# Patient Record
Sex: MALE | Race: WHITE | Employment: OTHER | URBAN - METROPOLITAN AREA
[De-identification: names, ages, dates, MRNs, and addresses within clinical notes are randomized per-mention and may not be internally consistent; named-entity substitution may affect disease eponyms.]

---

## 2019-02-11 ENCOUNTER — OFFICE VISIT (OUTPATIENT)
Dept: URGENT CARE | Facility: CLINIC | Age: 59
End: 2019-02-11
Payer: COMMERCIAL

## 2019-02-11 VITALS
RESPIRATION RATE: 16 BRPM | HEIGHT: 67 IN | DIASTOLIC BLOOD PRESSURE: 96 MMHG | SYSTOLIC BLOOD PRESSURE: 136 MMHG | WEIGHT: 156 LBS | HEART RATE: 80 BPM | BODY MASS INDEX: 24.48 KG/M2 | TEMPERATURE: 98.8 F | OXYGEN SATURATION: 96 %

## 2019-02-11 DIAGNOSIS — I10 ESSENTIAL HYPERTENSION: ICD-10-CM

## 2019-02-11 DIAGNOSIS — Z76.0 ENCOUNTER FOR MEDICATION REFILL: Primary | ICD-10-CM

## 2019-02-11 PROCEDURE — 99213 OFFICE O/P EST LOW 20 MIN: CPT | Performed by: NURSE PRACTITIONER

## 2019-02-11 RX ORDER — VALSARTAN AND HYDROCHLOROTHIAZIDE 320; 12.5 MG/1; MG/1
1 TABLET, FILM COATED ORAL DAILY
Qty: 30 TAB | Refills: 0 | Status: SHIPPED | OUTPATIENT
Start: 2019-02-11 | End: 2020-08-24

## 2019-02-11 RX ORDER — VALSARTAN AND HYDROCHLOROTHIAZIDE 320; 12.5 MG/1; MG/1
1 TABLET, FILM COATED ORAL DAILY
Qty: 30 TAB | Refills: 3 | Status: SHIPPED | OUTPATIENT
Start: 2019-02-11 | End: 2019-02-11 | Stop reason: SDUPTHER

## 2019-02-11 RX ORDER — VALSARTAN AND HYDROCHLOROTHIAZIDE 320; 12.5 MG/1; MG/1
1 TABLET, FILM COATED ORAL DAILY
Qty: 30 TAB | Refills: 0 | OUTPATIENT
Start: 2019-02-11 | End: 2019-02-11 | Stop reason: SDUPTHER

## 2019-02-11 RX ORDER — VALSARTAN AND HYDROCHLOROTHIAZIDE 320; 12.5 MG/1; MG/1
1 TABLET, FILM COATED ORAL EVERY EVENING
COMMUNITY
End: 2022-05-18

## 2019-02-11 ASSESSMENT — ENCOUNTER SYMPTOMS
EYES NEGATIVE: 1
SHORTNESS OF BREATH: 0
GASTROINTESTINAL NEGATIVE: 1
MUSCULOSKELETAL NEGATIVE: 1
CARDIOVASCULAR NEGATIVE: 1
CONSTITUTIONAL NEGATIVE: 1
RESPIRATORY NEGATIVE: 1
PSYCHIATRIC NEGATIVE: 1
NEUROLOGICAL NEGATIVE: 1

## 2019-02-12 NOTE — PROGRESS NOTES
Subjective:     Misael Mixon is a 58 y.o. male who presents for Medication Refill (Med refill for Diovan -HCT has appt with new PCP on 03/05/19)       Other   Pertinent negatives include no chest pain.   Patient presents to urgent care today for a medication refill.  He is in the process of changing primary care providers.  He ran out of his blood pressure medicine about a week ago.  He takes Diovan HCT daily.  Denies other symptoms or complaints including chest pain, shortness of breath.  Denies a history of heart disease.  Denies taking any other prescription medications.    PMH:  has a past medical history of Hyperlipidemia and Hypertension.    MEDS:   Current Outpatient Prescriptions:   •  valsartan-hydrochlorothiazide (DIOVAN-HCT) 320-12.5 MG per tablet, Take 1 Tab by mouth every day., Disp: , Rfl:   •  valsartan-hydrochlorothiazide (DIOVAN HCT) 320-12.5 MG per tablet, Take 1 Tab by mouth every day., Disp: 30 Tab, Rfl: 0  •  valsartan-hydrochlorothiazide (DIOVAN HCT) 160-12.5 MG per tablet, Take 1 Tab by mouth every day., Disp: 30 Tab, Rfl: 0  •  gemfibrozil (LOPID) 600 MG TABS, Take 1 Tab by mouth 2 times a day., Disp: 60 Each, Rfl: 6  •  Aspirin (ASPIR-81 PO), Take 1 Tab by mouth every day., Disp: , Rfl:   •  Niacin 1000 MG TBCR, Take 1,000 mg by mouth every day., Disp: , Rfl:     ALLERGIES:   Allergies   Allergen Reactions   • Sulfa Drugs      SURGHX:   Past Surgical History:   Procedure Laterality Date   • IRRIGATION & DEBRIDEMENT ORTHO  7/13/2011    Performed by KAY BYNUM at SURGERY [facility]   • OTHER      tonsills   • TONSILLECTOMY       SOCHX:  reports that he has never smoked. He has never used smokeless tobacco. He reports that he drinks about 3.5 oz of alcohol per week . He reports that he does not use drugs.     FH: Reviewed with patient, not pertinent to this visit.     Review of Systems   Constitutional: Negative.    HENT: Negative.    Eyes: Negative.    Respiratory:  "Negative.  Negative for shortness of breath.    Cardiovascular: Negative.  Negative for chest pain.   Gastrointestinal: Negative.    Genitourinary: Negative.    Musculoskeletal: Negative.    Skin: Negative.    Neurological: Negative.    Psychiatric/Behavioral: Negative.    All other systems reviewed and are negative.    Objective:     /96 (BP Location: Left arm, Patient Position: Sitting, BP Cuff Size: Adult)   Pulse 80   Temp 37.1 °C (98.8 °F) (Temporal)   Resp 16   Ht 1.702 m (5' 7\")   Wt 70.8 kg (156 lb)   SpO2 96%   BMI 24.43 kg/m²     Physical Exam   Constitutional: He is oriented to person, place, and time. He appears well-developed and well-nourished. He is cooperative. No distress.   HENT:   Head: Normocephalic.   Right Ear: External ear normal.   Left Ear: External ear normal.   Nose: Nose normal.   Eyes: Conjunctivae and EOM are normal.   Neck: Normal range of motion.   Cardiovascular: Normal rate, regular rhythm, normal heart sounds and normal pulses.    Pulmonary/Chest: Effort normal and breath sounds normal. No respiratory distress.   Abdominal: Soft. Bowel sounds are normal.   Musculoskeletal: Normal range of motion. He exhibits no deformity.   Neurological: He is alert and oriented to person, place, and time. He has normal strength. No sensory deficit.   Skin: Skin is warm and dry. Capillary refill takes less than 2 seconds.   Psychiatric: He has a normal mood and affect.   Vitals reviewed.       Assessment/Plan:     1. Encounter for medication refill  - valsartan-hydrochlorothiazide (DIOVAN HCT) 320-12.5 MG per tablet; Take 1 Tab by mouth every day.  Dispense: 30 Tab; Refill: 0    2. Essential hypertension  - valsartan-hydrochlorothiazide (DIOVAN HCT) 320-12.5 MG per tablet; Take 1 Tab by mouth every day.  Dispense: 30 Tab; Refill: 0    Pt presents to  for med refill. No acute complaints. 30-day refill/bridge prescription provided for Diovan HCT. Patient has a PCP appointment on " 3/5/2019.    Patient advised to: Return for 1) Symptoms don't improve or worsen, or go to ER, 2) Follow up with primary care in 7-10 days.    Differential diagnosis, natural history, supportive care, and indications for immediate follow-up discussed. All questions answered. Patient agrees with the plan of care.

## 2019-06-13 ENCOUNTER — OFFICE VISIT (OUTPATIENT)
Dept: URGENT CARE | Facility: CLINIC | Age: 59
End: 2019-06-13
Payer: COMMERCIAL

## 2019-06-13 VITALS
HEIGHT: 67 IN | SYSTOLIC BLOOD PRESSURE: 130 MMHG | WEIGHT: 153 LBS | TEMPERATURE: 98.2 F | HEART RATE: 74 BPM | OXYGEN SATURATION: 96 % | BODY MASS INDEX: 24.01 KG/M2 | DIASTOLIC BLOOD PRESSURE: 72 MMHG | RESPIRATION RATE: 14 BRPM

## 2019-06-13 DIAGNOSIS — R13.10 DIFFICULTY SWALLOWING SOLIDS: ICD-10-CM

## 2019-06-13 PROCEDURE — 99213 OFFICE O/P EST LOW 20 MIN: CPT | Performed by: PHYSICIAN ASSISTANT

## 2019-06-13 ASSESSMENT — ENCOUNTER SYMPTOMS
SHORTNESS OF BREATH: 0
CHILLS: 0
DIAPHORESIS: 0
SORE THROAT: 0
CONSTITUTIONAL NEGATIVE: 1
FEVER: 0
COUGH: 0
WEIGHT LOSS: 0

## 2019-06-13 NOTE — PROGRESS NOTES
Subjective:   Misael Mixon is a 58 y.o. male who presents today with   Chief Complaint   Patient presents with   • Difficulty Swallowing     occasional difficulty swallowing - feels like food gets stuck in throat       HPI  Patient presents today for a new problem that has been occurring over the past couple of weeks. He states that he has had some trouble swallowing solid foods when he does not chew them up enough. He says liquids give him no issues. No other signs or symptoms including pain, hoarseness of voice. Patient is requesting a referral to have further testing done at this time as he is between primary care doctors.   PMH:  has a past medical history of Hyperlipidemia and Hypertension.  MEDS:   Current Outpatient Prescriptions:   •  valsartan-hydrochlorothiazide (DIOVAN-HCT) 320-12.5 MG per tablet, Take 1 Tab by mouth every day., Disp: , Rfl:   •  valsartan-hydrochlorothiazide (DIOVAN HCT) 320-12.5 MG per tablet, Take 1 Tab by mouth every day., Disp: 30 Tab, Rfl: 0  •  valsartan-hydrochlorothiazide (DIOVAN HCT) 160-12.5 MG per tablet, Take 1 Tab by mouth every day., Disp: 30 Tab, Rfl: 0  •  gemfibrozil (LOPID) 600 MG TABS, Take 1 Tab by mouth 2 times a day., Disp: 60 Each, Rfl: 6  •  Aspirin (ASPIR-81 PO), Take 1 Tab by mouth every day., Disp: , Rfl:   •  Niacin 1000 MG TBCR, Take 1,000 mg by mouth every day., Disp: , Rfl:   ALLERGIES:   Allergies   Allergen Reactions   • Sulfa Drugs      SURGHX:   Past Surgical History:   Procedure Laterality Date   • IRRIGATION & DEBRIDEMENT ORTHO  7/13/2011    Performed by KAY BYNUM at SURGERY [facility]   • OTHER      tonsills   • TONSILLECTOMY       SOCHX:  reports that he has never smoked. He has never used smokeless tobacco. He reports that he drinks about 3.5 oz of alcohol per week . He reports that he does not use drugs.  FH: Reviewed with patient, not pertinent to this visit.       Review of Systems   Constitutional: Negative.  Negative for  "chills, diaphoresis, fever, malaise/fatigue and weight loss.        Difficult to swallow certain solids   HENT: Negative for sore throat.    Respiratory: Negative for cough and shortness of breath.         Objective:   /72 (BP Location: Left arm, Patient Position: Sitting, BP Cuff Size: Adult)   Pulse 74   Temp 36.8 °C (98.2 °F) (Temporal)   Resp 14   Ht 1.702 m (5' 7.01\")   Wt 69.4 kg (153 lb)   SpO2 96%   BMI 23.96 kg/m²   Physical Exam   Constitutional: Vital signs are normal. He appears well-developed and well-nourished. No distress.   HENT:   Head: Normocephalic and atraumatic.   Right Ear: Hearing normal.   Left Ear: Hearing normal.   Mouth/Throat: Dental caries present. No oropharyngeal exudate, posterior oropharyngeal edema or posterior oropharyngeal erythema.   No mass palpated during swallowing on exam. No visual abnormalities in patient's oral cavity.    Eyes: Pupils are equal, round, and reactive to light.   Neck: Neck supple. No thyromegaly present.   Cardiovascular: Normal rate, regular rhythm and normal heart sounds.    Pulmonary/Chest: Effort normal.   Musculoskeletal:   Normal movement in all 4 extremities   Lymphadenopathy:     He has no cervical adenopathy.   Neurological: He is alert. Coordination normal.   Skin: Skin is warm and dry.   Psychiatric: He has a normal mood and affect.   Nursing note and vitals reviewed.        Assessment/Plan:   Assessment    1. Difficulty swallowing solids  - REFERRAL TO ENT  Differential diagnosis, natural history, supportive care, and indications for immediate follow-up discussed.   Patient given instructions and understanding of medications and treatment.      Patient agreeable to plan.      Please note that this dictation was created using voice recognition software. I have made every reasonable attempt to correct obvious errors, but I expect that there are errors of grammar and possibly content that I did not discover before finalizing the " note.    Juni Lozada PA-C

## 2020-08-24 ENCOUNTER — OFFICE VISIT (OUTPATIENT)
Dept: URGENT CARE | Facility: CLINIC | Age: 60
End: 2020-08-24
Payer: COMMERCIAL

## 2020-08-24 ENCOUNTER — HOSPITAL ENCOUNTER (EMERGENCY)
Facility: MEDICAL CENTER | Age: 60
End: 2020-08-24
Attending: EMERGENCY MEDICINE
Payer: COMMERCIAL

## 2020-08-24 VITALS
TEMPERATURE: 97.6 F | HEART RATE: 82 BPM | WEIGHT: 153.66 LBS | DIASTOLIC BLOOD PRESSURE: 99 MMHG | RESPIRATION RATE: 11 BRPM | BODY MASS INDEX: 24.12 KG/M2 | OXYGEN SATURATION: 97 % | SYSTOLIC BLOOD PRESSURE: 160 MMHG | HEIGHT: 67 IN

## 2020-08-24 VITALS
OXYGEN SATURATION: 96 % | WEIGHT: 150 LBS | HEIGHT: 67 IN | RESPIRATION RATE: 16 BRPM | DIASTOLIC BLOOD PRESSURE: 78 MMHG | HEART RATE: 68 BPM | SYSTOLIC BLOOD PRESSURE: 132 MMHG | BODY MASS INDEX: 23.54 KG/M2 | TEMPERATURE: 97.8 F

## 2020-08-24 DIAGNOSIS — R00.2 PALPITATIONS: ICD-10-CM

## 2020-08-24 DIAGNOSIS — I49.3 PVC (PREMATURE VENTRICULAR CONTRACTION): ICD-10-CM

## 2020-08-24 DIAGNOSIS — R07.89 OTHER CHEST PAIN: ICD-10-CM

## 2020-08-24 LAB
ANION GAP SERPL CALC-SCNC: 14 MMOL/L (ref 7–16)
BASOPHILS # BLD AUTO: 0.6 % (ref 0–1.8)
BASOPHILS # BLD: 0.04 K/UL (ref 0–0.12)
BUN SERPL-MCNC: 4 MG/DL (ref 8–22)
CALCIUM SERPL-MCNC: 9.4 MG/DL (ref 8.4–10.2)
CHLORIDE SERPL-SCNC: 95 MMOL/L (ref 96–112)
CO2 SERPL-SCNC: 22 MMOL/L (ref 20–33)
CREAT SERPL-MCNC: 0.61 MG/DL (ref 0.5–1.4)
EKG IMPRESSION: NORMAL
EOSINOPHIL # BLD AUTO: 0 K/UL (ref 0–0.51)
EOSINOPHIL NFR BLD: 0 % (ref 0–6.9)
ERYTHROCYTE [DISTWIDTH] IN BLOOD BY AUTOMATED COUNT: 34.6 FL (ref 35.9–50)
GLUCOSE SERPL-MCNC: 151 MG/DL (ref 65–99)
HCT VFR BLD AUTO: 41.6 % (ref 42–52)
HGB BLD-MCNC: 15.3 G/DL (ref 14–18)
IMM GRANULOCYTES # BLD AUTO: 0.03 K/UL (ref 0–0.11)
IMM GRANULOCYTES NFR BLD AUTO: 0.5 % (ref 0–0.9)
LYMPHOCYTES # BLD AUTO: 1.02 K/UL (ref 1–4.8)
LYMPHOCYTES NFR BLD: 15.5 % (ref 22–41)
MCH RBC QN AUTO: 31.3 PG (ref 27–33)
MCHC RBC AUTO-ENTMCNC: 36.8 G/DL (ref 33.7–35.3)
MCV RBC AUTO: 85.1 FL (ref 81.4–97.8)
MONOCYTES # BLD AUTO: 0.57 K/UL (ref 0–0.85)
MONOCYTES NFR BLD AUTO: 8.7 % (ref 0–13.4)
NEUTROPHILS # BLD AUTO: 4.91 K/UL (ref 1.82–7.42)
NEUTROPHILS NFR BLD: 74.7 % (ref 44–72)
NRBC # BLD AUTO: 0 K/UL
NRBC BLD-RTO: 0 /100 WBC
PLATELET # BLD AUTO: 246 K/UL (ref 164–446)
PMV BLD AUTO: 8.9 FL (ref 9–12.9)
POTASSIUM SERPL-SCNC: 3.7 MMOL/L (ref 3.6–5.5)
RBC # BLD AUTO: 4.89 M/UL (ref 4.7–6.1)
SODIUM SERPL-SCNC: 131 MMOL/L (ref 135–145)
WBC # BLD AUTO: 6.6 K/UL (ref 4.8–10.8)

## 2020-08-24 PROCEDURE — 93005 ELECTROCARDIOGRAM TRACING: CPT

## 2020-08-24 PROCEDURE — 85025 COMPLETE CBC W/AUTO DIFF WBC: CPT

## 2020-08-24 PROCEDURE — 93000 ELECTROCARDIOGRAM COMPLETE: CPT | Performed by: NURSE PRACTITIONER

## 2020-08-24 PROCEDURE — 99283 EMERGENCY DEPT VISIT LOW MDM: CPT

## 2020-08-24 PROCEDURE — 99215 OFFICE O/P EST HI 40 MIN: CPT | Performed by: NURSE PRACTITIONER

## 2020-08-24 PROCEDURE — 80048 BASIC METABOLIC PNL TOTAL CA: CPT

## 2020-08-24 PROCEDURE — 93005 ELECTROCARDIOGRAM TRACING: CPT | Performed by: EMERGENCY MEDICINE

## 2020-08-24 RX ORDER — CYANOCOBALAMIN (VITAMIN B-12) 2000 MCG
2000 TABLET ORAL EVERY MORNING
Status: SHIPPED | COMMUNITY
End: 2022-03-14

## 2020-08-24 SDOH — HEALTH STABILITY: MENTAL HEALTH: HOW MANY STANDARD DRINKS CONTAINING ALCOHOL DO YOU HAVE ON A TYPICAL DAY?: 5 OR 6

## 2020-08-24 SDOH — HEALTH STABILITY: MENTAL HEALTH: HOW OFTEN DO YOU HAVE A DRINK CONTAINING ALCOHOL?: 4 OR MORE TIMES A WEEK

## 2020-08-24 SDOH — HEALTH STABILITY: MENTAL HEALTH: HOW OFTEN DO YOU HAVE 6 OR MORE DRINKS ON ONE OCCASION?: DAILY OR ALMOST DAILY

## 2020-08-24 ASSESSMENT — ENCOUNTER SYMPTOMS
FEVER: 0
CHILLS: 0
PALPITATIONS: 1

## 2020-08-24 NOTE — PROGRESS NOTES
Subjective:      Misael Mixon is a 59 y.o. male who presents with Chest Pain (Pt thinks Afib?  x 2.5 hrs   Pt states that he feels dizzy and as if he has a hollow feeling in check)    Past Medical History:   Diagnosis Date   • Hyperlipidemia    • Hypertension      Social History     Socioeconomic History   • Marital status: Single     Spouse name: Not on file   • Number of children: Not on file   • Years of education: Not on file   • Highest education level: Not on file   Occupational History   • Not on file   Social Needs   • Financial resource strain: Not on file   • Food insecurity     Worry: Not on file     Inability: Not on file   • Transportation needs     Medical: Not on file     Non-medical: Not on file   Tobacco Use   • Smoking status: Never Smoker   • Smokeless tobacco: Never Used   Substance and Sexual Activity   • Alcohol use: Yes     Alcohol/week: 3.5 oz     Types: 7 Cans of beer per week     Frequency: 4 or more times a week     Drinks per session: 5 or 6     Binge frequency: Daily or almost daily   • Drug use: No   • Sexual activity: Yes     Partners: Female   Lifestyle   • Physical activity     Days per week: Not on file     Minutes per session: Not on file   • Stress: Not on file   Relationships   • Social connections     Talks on phone: Not on file     Gets together: Not on file     Attends Je[employer] witness service: Not on file     Active member of club or organization: Not on file     Attends meetings of clubs or organizations: Not on file     Relationship status: Not on file   • Intimate partner violence     Fear of current or ex partner: Not on file     Emotionally abused: Not on file     Physically abused: Not on file     Forced sexual activity: Not on file   Other Topics Concern   • Not on file   Social History Narrative   • Not on file     Family History   Problem Relation Age of Onset   • Stroke Mother    • Cancer Father    • Hypertension Father    • Stroke Father    • Cancer Brother   "      Allergies: Sulfa drugs    Patient is a 59-year-old male who presents today with complaint of sensation of palpitations in his chest and an irregular heartbeat.  States he has a hollow feeling in his chest; he does not equate this his pain specifically.  States he felt a little lightheaded earlier today but no syncope or near syncope.  He states that has resolved at this time.  No nausea or vomiting.  No upper back pain, no upper abdominal pain, neck, or jaw pain.          Chest Pain   This is a new problem. The current episode started today. The onset quality is sudden. The problem occurs constantly. The pain is moderate. Associated symptoms include palpitations. Pertinent negatives include no fever.       Review of Systems   Constitutional: Negative for chills and fever.   Cardiovascular: Positive for chest pain and palpitations.   All other systems reviewed and are negative.         Objective:     /90   Pulse 68   Temp 36.6 °C (97.8 °F) (Temporal)   Resp 16   Ht 1.702 m (5' 7\")   Wt 68 kg (150 lb)   SpO2 96%   BMI 23.49 kg/m²      Physical Exam  Vitals signs reviewed.   Constitutional:       Appearance: Normal appearance.   HENT:      Head: Atraumatic.      Right Ear: Tympanic membrane, ear canal and external ear normal.      Left Ear: Tympanic membrane, ear canal and external ear normal.      Nose: Nose normal.      Mouth/Throat:      Mouth: Mucous membranes are moist.   Eyes:      Extraocular Movements: Extraocular movements intact.      Conjunctiva/sclera: Conjunctivae normal.      Pupils: Pupils are equal, round, and reactive to light.   Neck:      Musculoskeletal: Normal range of motion and neck supple.   Cardiovascular:      Rate and Rhythm: Normal rate and regular rhythm.      Heart sounds: Normal heart sounds.   Pulmonary:      Effort: Pulmonary effort is normal.      Breath sounds: Normal breath sounds.   Musculoskeletal: Normal range of motion.   Skin:     General: Skin is warm and " dry.      Capillary Refill: Capillary refill takes less than 2 seconds.   Neurological:      Mental Status: He is alert and oriented to person, place, and time.   Psychiatric:         Mood and Affect: Mood normal.         Behavior: Behavior normal.         Thought Content: Thought content normal.         Judgment: Judgment normal.     EKG: Sinus rhythm with few intermittent PVCs noted.  No ST elevation or depression, no ischemic changes.  Advised patient that due to his symptoms, I would strongly advise going to ER for further evaluation as I cannot completely rule out cardiac related symptoms to his lightheadedness and discomfort.   I spoke to patient and his wife about this, patient verbalized understanding and agreement, states that they will go to ER at [facility].       Assessment/Plan:   Palpitations  Intermittent PVCs    See note above   Patient will follow-up in ER at [facility] for further evaluation at this time.  Referral given to cardiology for follow-up as well.     There are no diagnoses linked to this encounter.

## 2020-08-24 NOTE — ED PROVIDER NOTES
"ED Provider Note    CHIEF COMPLAINT  Chief Complaint   Patient presents with   • Palpitations     Reports feeling \"fluttery in the chest and at one point getting a little dizzy\", seen at , and PVC noted on EKG.  recommended pt. come to ER. Denies CP or SOB.        HPI  Misael Clifton Mixon is a 59 y.o. male who presents for evaluation of palpitations.  He went to urgent care where he was diagnosed with PVCs, the patient states that the urgent care presented him with 2 options, he could either come the emergency department for further evaluation or be discharged home to follow-up with his family physician.  He states that out of an abundance precaution he came to the emergency department.  He states the only thing it was different about today was that he had 2 cups of caffeinated coffee this morning and usually he drinks decaffeinated coffee.  He has had no shortness of breath or chest pain or fever or abdominal pain.  He states he otherwise feels fine.  History of hypertension and hyperlipidemia and has no other medical problems and offers no other complaints at this time.  He states that the palpitations have significantly improved since he has been here in the emergency department.    REVIEW OF SYSTEMS  Negative for fever, rash, chest pain, dyspnea, abdominal pain, nausea, vomiting, diarrhea, headache, focal weakness, focal numbness, focal tingling, back pain. All other systems are negative.     PAST MEDICAL HISTORY  Past Medical History:   Diagnosis Date   • Hyperlipidemia    • Hypertension        FAMILY HISTORY  Family History   Problem Relation Age of Onset   • Stroke Mother    • Cancer Father    • Hypertension Father    • Stroke Father    • Cancer Brother        SOCIAL HISTORY  Social History     Tobacco Use   • Smoking status: Never Smoker   • Smokeless tobacco: Never Used   Substance Use Topics   • Alcohol use: Yes     Alcohol/week: 3.5 oz     Types: 7 Cans of beer per week     Frequency: 4 or more " "times a week     Drinks per session: 5 or 6     Binge frequency: Daily or almost daily   • Drug use: No       SURGICAL HISTORY  Past Surgical History:   Procedure Laterality Date   • IRRIGATION & DEBRIDEMENT ORTHO  7/13/2011    Performed by KAY BYNUM at SURGERY [facility] ORS   • OTHER      tonsills   • TONSILLECTOMY         CURRENT MEDICATIONS  I personally reviewed the medication list in the charting documentation.     ALLERGIES  Allergies   Allergen Reactions   • Sulfa Drugs Rash     rash       MEDICAL RECORD  I have reviewed patient's medical record and pertinent results are listed above.      PHYSICAL EXAM  VITAL SIGNS: /98   Pulse 93   Temp 36.3 °C (97.4 °F) (Temporal)   Resp (!) 11   Ht 1.702 m (5' 7\")   Wt 69.7 kg (153 lb 10.6 oz)   SpO2 98%   BMI 24.07 kg/m²    Constitutional: Well appearing patient in no acute distress.  Not toxic, nor ill in appearance.  Eyes: No scleral icterus. Normal conjunctiva   Neck: Supple, comfortable, nonpainful range of motion.   Cardiovascular: Occasional irregularity otherwise normal regular rate and rhythm  Thorax & Lungs: Chest is nontender.  Lungs are clear to auscultation with good air movement bilaterally.  No wheeze, rhonchi, nor rales.   Abdomen: Soft, with no tenderness, rebound nor guarding.  No mass or pulsatile mass appreciated.  Skin: Warm, dry. No rash appreciated  Extremities/Musculoskeletal: No sign of trauma. No asymmetric calf tenderness, erythema or edema. Normal range of motion   Neurologic: Alert & oriented. No focal deficits observed.   Psychiatric: Normal affect appropriate for the clinical situation.    DIAGNOSTIC STUDIES / PROCEDURES    LABS/EKGs  Results for orders placed or performed during the hospital encounter of 08/24/20   CBC WITH DIFFERENTIAL   Result Value Ref Range    WBC 6.6 4.8 - 10.8 K/uL    RBC 4.89 4.70 - 6.10 M/uL    Hemoglobin 15.3 14.0 - 18.0 g/dL    Hematocrit 41.6 (L) 42.0 - 52.0 %    MCV 85.1 81.4 - 97.8 fL    " MCH 31.3 27.0 - 33.0 pg    MCHC 36.8 (H) 33.7 - 35.3 g/dL    RDW 34.6 (L) 35.9 - 50.0 fL    Platelet Count 246 164 - 446 K/uL    MPV 8.9 (L) 9.0 - 12.9 fL    Neutrophils-Polys 74.70 (H) 44.00 - 72.00 %    Lymphocytes 15.50 (L) 22.00 - 41.00 %    Monocytes 8.70 0.00 - 13.40 %    Eosinophils 0.00 0.00 - 6.90 %    Basophils 0.60 0.00 - 1.80 %    Immature Granulocytes 0.50 0.00 - 0.90 %    Nucleated RBC 0.00 /100 WBC    Neutrophils (Absolute) 4.91 1.82 - 7.42 K/uL    Lymphs (Absolute) 1.02 1.00 - 4.80 K/uL    Monos (Absolute) 0.57 0.00 - 0.85 K/uL    Eos (Absolute) 0.00 0.00 - 0.51 K/uL    Baso (Absolute) 0.04 0.00 - 0.12 K/uL    Immature Granulocytes (abs) 0.03 0.00 - 0.11 K/uL    NRBC (Absolute) 0.00 K/uL   BMP   Result Value Ref Range    Sodium 131 (L) 135 - 145 mmol/L    Potassium 3.7 3.6 - 5.5 mmol/L    Chloride 95 (L) 96 - 112 mmol/L    Co2 22 20 - 33 mmol/L    Glucose 151 (H) 65 - 99 mg/dL    Bun 4 (L) 8 - 22 mg/dL    Creatinine 0.61 0.50 - 1.40 mg/dL    Calcium 9.4 8.4 - 10.2 mg/dL    Anion Gap 14.0 7.0 - 16.0   ESTIMATED GFR   Result Value Ref Range    GFR If African American >60 >60 mL/min/1.73 m 2    GFR If Non African American >60 >60 mL/min/1.73 m 2   EKG   Result Value Ref Range    Report       [facility] Emergency Dept.    Test Date:  2020  Pt Name:    ROSS SALCIDO              Department: [facility]  MRN:        [id number]                      Room:  Gender:     Male                         Technician: BRYAN  :        1960                   Requested By:ER TRIAGE PROTOCOL  Order #:    [id number]                    Reading MD: ARCENIO WANG MD    Measurements  Intervals                                Axis  Rate:       92                           P:          53  VA:         146                          QRS:        13  QRSD:       98                           T:          64  QT:         357  QTc:        442    Interpretive Statements  12 Lead EKG interpreted by me to show:  -- Rate 92 -- Rhythm: Normal sinus  rhythm  -- Axis: Normal -- CA and QRS Intervals: Normal -- T waves: No acute changes  --  ST segments: No acute changes -- Ectopy: Multiple PVCs . My impression of  this  EKG: Does not indicate acute ischemia at this time.  Electronically  Signed On 8- 17:07:12 PDT by ARCENIO WANG MD          COURSE & MEDICAL DECISION MAKING  I have reviewed any medical record information, laboratory studies and radiographic results as noted above.  Differential diagnoses includes: Dehydration, electrolyte abnormalities, anemia, caffeine side effects    Encounter Summary: This is a 59 y.o. male with palpitations throughout today, went to urgent care where he was diagnosed with multiple PVCs, upon evaluation here the patient states his symptoms have been improving, EKG does reveal frequent PVCs but less frequent than the EKG from urgent care and his monitor reveals decreasing frequency of PVCs.  He drank 2 cups of caffeinated coffee this morning which is abnormal for him, usually drinks decaf, less likely the etiology.  He has no chest pain and he has a nonischemic EKG.  His blood work reveals low sodium and low chloride which he states was also present on recent blood work.  At this point I do suspect the caffeine ingestion has been etiology of his PVCs and thus palpitations.  He is stable and appropriate for discharge with strict return instructions discussed and he will follow-up with his primary care provider.      DISPOSITION: Discharged home in stable condition      FINAL IMPRESSION  1. Palpitations    2. PVC (premature ventricular contraction)           This dictation was created using voice recognition software. The accuracy of the dictation is limited to the abilities of the software. I expect there may be some errors of grammar and possibly content. The nursing notes were reviewed and certain aspects of this information were incorporated into this note.    Electronically  signed by: John Arreaga M.D., 8/24/2020 4:24 PM

## 2021-03-15 DIAGNOSIS — Z23 NEED FOR VACCINATION: ICD-10-CM

## 2021-03-19 ENCOUNTER — IMMUNIZATION (OUTPATIENT)
Dept: FAMILY PLANNING/WOMEN'S HEALTH CLINIC | Facility: IMMUNIZATION CENTER | Age: 61
End: 2021-03-19
Attending: INTERNAL MEDICINE
Payer: COMMERCIAL

## 2021-03-19 DIAGNOSIS — Z23 NEED FOR VACCINATION: ICD-10-CM

## 2021-03-19 DIAGNOSIS — Z23 ENCOUNTER FOR VACCINATION: Primary | ICD-10-CM

## 2021-03-19 PROCEDURE — 0011A MODERNA SARS-COV-2 VACCINE: CPT

## 2021-03-19 PROCEDURE — 91301 MODERNA SARS-COV-2 VACCINE: CPT

## 2021-04-17 ENCOUNTER — IMMUNIZATION (OUTPATIENT)
Dept: FAMILY PLANNING/WOMEN'S HEALTH CLINIC | Facility: IMMUNIZATION CENTER | Age: 61
End: 2021-04-17
Attending: INTERNAL MEDICINE
Payer: COMMERCIAL

## 2021-04-17 DIAGNOSIS — Z23 ENCOUNTER FOR VACCINATION: Primary | ICD-10-CM

## 2021-04-17 PROCEDURE — 91301 MODERNA SARS-COV-2 VACCINE: CPT

## 2021-04-17 PROCEDURE — 0012A MODERNA SARS-COV-2 VACCINE: CPT

## 2021-12-21 ENCOUNTER — APPOINTMENT (OUTPATIENT)
Dept: RADIOLOGY | Facility: IMAGING CENTER | Age: 61
End: 2021-12-21
Attending: NURSE PRACTITIONER
Payer: COMMERCIAL

## 2021-12-21 ENCOUNTER — OFFICE VISIT (OUTPATIENT)
Dept: URGENT CARE | Facility: CLINIC | Age: 61
End: 2021-12-21
Payer: COMMERCIAL

## 2021-12-21 VITALS
DIASTOLIC BLOOD PRESSURE: 80 MMHG | OXYGEN SATURATION: 98 % | WEIGHT: 160 LBS | BODY MASS INDEX: 25.11 KG/M2 | HEIGHT: 67 IN | HEART RATE: 86 BPM | TEMPERATURE: 97.7 F | SYSTOLIC BLOOD PRESSURE: 122 MMHG | RESPIRATION RATE: 16 BRPM

## 2021-12-21 DIAGNOSIS — J02.9 PHARYNGITIS, UNSPECIFIED ETIOLOGY: ICD-10-CM

## 2021-12-21 DIAGNOSIS — R05.3 CHRONIC COUGH: ICD-10-CM

## 2021-12-21 DIAGNOSIS — R17 SCLERAL ICTERUS: ICD-10-CM

## 2021-12-21 PROBLEM — L57.0 ACTINIC KERATOSIS: Status: ACTIVE | Noted: 2017-04-27

## 2021-12-21 PROBLEM — I10 ESSENTIAL HYPERTENSION: Status: ACTIVE | Noted: 2021-12-21

## 2021-12-21 PROBLEM — E78.00 PURE HYPERCHOLESTEROLEMIA: Status: ACTIVE | Noted: 2021-12-21

## 2021-12-21 PROBLEM — E55.9 VITAMIN D DEFICIENCY: Status: ACTIVE | Noted: 2021-12-21

## 2021-12-21 PROBLEM — R73.01 IMPAIRED FASTING GLUCOSE: Status: ACTIVE | Noted: 2021-12-21

## 2021-12-21 PROBLEM — E78.2 MIXED HYPERLIPIDEMIA: Status: ACTIVE | Noted: 2021-12-21

## 2021-12-21 PROBLEM — I10 HTN (HYPERTENSION): Status: ACTIVE | Noted: 2019-03-05

## 2021-12-21 PROBLEM — E78.5 HYPERLIPIDEMIA, UNSPECIFIED: Status: ACTIVE | Noted: 2020-02-05

## 2021-12-21 LAB
INT CON NEG: NORMAL
INT CON POS: NORMAL
S PYO AG THROAT QL: NEGATIVE

## 2021-12-21 PROCEDURE — 71046 X-RAY EXAM CHEST 2 VIEWS: CPT | Mod: TC | Performed by: NURSE PRACTITIONER

## 2021-12-21 PROCEDURE — 87880 STREP A ASSAY W/OPTIC: CPT | Performed by: NURSE PRACTITIONER

## 2021-12-21 PROCEDURE — 99214 OFFICE O/P EST MOD 30 MIN: CPT | Performed by: NURSE PRACTITIONER

## 2021-12-21 RX ORDER — CLOBETASOL PROPIONATE 0.46 MG/ML
1 SOLUTION TOPICAL PRN
COMMUNITY
Start: 2021-11-11 | End: 2022-03-14

## 2021-12-21 RX ORDER — KETOCONAZOLE 20 MG/ML
1 SHAMPOO TOPICAL PRN
COMMUNITY
Start: 2021-11-11 | End: 2022-03-14

## 2021-12-21 ASSESSMENT — ENCOUNTER SYMPTOMS
COUGH: 1
SHORTNESS OF BREATH: 0
HEADACHES: 0
SORE THROAT: 1
FEVER: 0

## 2021-12-22 NOTE — PROGRESS NOTES
Subjective:     Misael Mixon is a 61 y.o. male who presents for Cough (x 7 wks, cough, x yesterday lump on Rt. side of throat.  Tested negative for COVID)      Cough  This is a new problem. The current episode started more than 1 month ago (Misael is a pleasant 61 year old male who presents to  today for complaints of a cough and URI that presented approximately 7 weeks ago. He notes his symptoms have improved, but he has recently developed an intermittent right sided sore throat ). The problem has been waxing and waning. The cough is non-productive. Associated symptoms include a sore throat. Pertinent negatives include no ear pain, fever, headaches, nasal congestion, postnasal drip or shortness of breath. Nothing aggravates the symptoms. Treatments tried: cough drop and drinking water. His past medical history is significant for environmental allergies. There is no history of asthma, bronchitis or pneumonia.   In addition to his cough and intermittent sore throat, he states he is concerned that his eyes appear yellow. His wife notes yellowing of his sclera. He does note excessive alcohol use since COVID began.       Review of Systems   Constitutional: Negative for fever.   HENT: Positive for sore throat. Negative for ear pain and postnasal drip.    Respiratory: Positive for cough. Negative for shortness of breath.    Neurological: Negative for headaches.   Endo/Heme/Allergies: Positive for environmental allergies.       PMH:   Past Medical History:   Diagnosis Date   • Hyperlipidemia    • Hypertension      ALLERGIES:   Allergies   Allergen Reactions   • Sulfa Drugs Rash     rash     SURGHX:   Past Surgical History:   Procedure Laterality Date   • IRRIGATION & DEBRIDEMENT ORTHO  7/13/2011    Performed by KAY BYNUM at SUR[facility]   • OTHER      tonsills   • TONSILLECTOMY       SOCHX:   Social History     Socioeconomic History   • Marital status: Single     Spouse name: Not on file   •  Number of children: Not on file   • Years of education: Not on file   • Highest education level: Not on file   Occupational History   • Not on file   Tobacco Use   • Smoking status: Never Smoker   • Smokeless tobacco: Never Used   Vaping Use   • Vaping Use: Former   Substance and Sexual Activity   • Alcohol use: Yes     Alcohol/week: 3.5 oz     Types: 7 Cans of beer per week   • Drug use: No   • Sexual activity: Yes     Partners: Female   Other Topics Concern   • Not on file   Social History Narrative   • Not on file     Social Determinants of Health     Financial Resource Strain:    • Difficulty of Paying Living Expenses: Not on file   Food Insecurity:    • Worried About Running Out of Food in the Last Year: Not on file   • Ran Out of Food in the Last Year: Not on file   Transportation Needs:    • Lack of Transportation (Medical): Not on file   • Lack of Transportation (Non-Medical): Not on file   Physical Activity:    • Days of Exercise per Week: Not on file   • Minutes of Exercise per Session: Not on file   Stress:    • Feeling of Stress : Not on file   Social Connections:    • Frequency of Communication with Friends and Family: Not on file   • Frequency of Social Gatherings with Friends and Family: Not on file   • Attends [employer] Services: Not on file   • Active Member of Clubs or Organizations: Not on file   • Attends Club or Organization Meetings: Not on file   • Marital Status: Not on file   Intimate Partner Violence:    • Fear of Current or Ex-Partner: Not on file   • Emotionally Abused: Not on file   • Physically Abused: Not on file   • Sexually Abused: Not on file   Housing Stability:    • Unable to Pay for Housing in the Last Year: Not on file   • Number of Places Lived in the Last Year: Not on file   • Unstable Housing in the Last Year: Not on file     FH:   Family History   Problem Relation Age of Onset   • Stroke Mother    • Cancer Father    • Hypertension Father    • Stroke Father    • Cancer  "Brother          Objective:   /80 (BP Location: Left arm, Patient Position: Sitting, BP Cuff Size: Adult)   Pulse 86   Temp 36.5 °C (97.7 °F) (Temporal)   Resp 16   Ht 1.702 m (5' 7\")   Wt 72.6 kg (160 lb)   SpO2 98%   BMI 25.06 kg/m²     Physical Exam  Vitals and nursing note reviewed.   Constitutional:       General: He is not in acute distress.     Appearance: Normal appearance. He is not ill-appearing.   HENT:      Head: Normocephalic and atraumatic.      Right Ear: Tympanic membrane, ear canal and external ear normal. There is no impacted cerumen.      Left Ear: Tympanic membrane, ear canal and external ear normal. There is no impacted cerumen.      Nose: No congestion or rhinorrhea.      Mouth/Throat:      Mouth: Mucous membranes are moist.      Pharynx: No oropharyngeal exudate or posterior oropharyngeal erythema.   Eyes:      Extraocular Movements: Extraocular movements intact.      Pupils: Pupils are equal, round, and reactive to light.   Cardiovascular:      Rate and Rhythm: Normal rate and regular rhythm.      Pulses: Normal pulses.      Heart sounds: Normal heart sounds.   Pulmonary:      Effort: Pulmonary effort is normal. No respiratory distress.      Breath sounds: Normal breath sounds. No stridor. No wheezing, rhonchi or rales.   Chest:      Chest wall: No tenderness.   Abdominal:      General: Abdomen is flat. Bowel sounds are normal.      Palpations: Abdomen is soft.      Tenderness: There is no abdominal tenderness. There is no right CVA tenderness or left CVA tenderness.   Musculoskeletal:         General: Normal range of motion.      Cervical back: Normal range of motion and neck supple. No tenderness.   Lymphadenopathy:      Cervical: No cervical adenopathy.   Skin:     General: Skin is warm and dry.      Capillary Refill: Capillary refill takes less than 2 seconds.   Neurological:      General: No focal deficit present.      Mental Status: He is alert and oriented to person, " place, and time. Mental status is at baseline.   Psychiatric:         Mood and Affect: Mood normal.         Behavior: Behavior normal.         Thought Content: Thought content normal.         Judgment: Judgment normal.       DX chest:   IMPRESSION:     No acute cardiopulmonary abnormality.  POCT strep: negative  Assessment/Plan:   Assessment    1. Chronic cough  DX-CHEST-2 VIEWS    CBC WITH DIFFERENTIAL    Comp Metabolic Panel    BILIRUBIN TOTAL   2. Pharyngitis, unspecified etiology  POCT Rapid Strep A    CBC WITH DIFFERENTIAL    Comp Metabolic Panel    BILIRUBIN TOTAL   3. Scleral icterus  CBC WITH DIFFERENTIAL    Comp Metabolic Panel    BILIRUBIN TOTAL   X ray results discussed with patient. I do agree with the radiologists interpretation.   His cough is progressively improving. He declines prescription cough syrup or antibiotics to help fight infection. Lab work ordered to evaluate sclera icterus.   AVS handout given and reviewed with patient. Pt educated on red flags and when to seek treatment back in ER or UC.

## 2021-12-31 ENCOUNTER — HOSPITAL ENCOUNTER (OUTPATIENT)
Dept: LAB | Facility: MEDICAL CENTER | Age: 61
End: 2021-12-31
Attending: NURSE PRACTITIONER
Payer: COMMERCIAL

## 2021-12-31 DIAGNOSIS — R05.3 CHRONIC COUGH: ICD-10-CM

## 2021-12-31 DIAGNOSIS — R17 SCLERAL ICTERUS: ICD-10-CM

## 2021-12-31 DIAGNOSIS — J02.9 PHARYNGITIS, UNSPECIFIED ETIOLOGY: ICD-10-CM

## 2021-12-31 LAB
ALBUMIN SERPL BCP-MCNC: 4.7 G/DL (ref 3.2–4.9)
ALBUMIN/GLOB SERPL: 1.7 G/DL
ALP SERPL-CCNC: 61 U/L (ref 30–99)
ALT SERPL-CCNC: 21 U/L (ref 2–50)
ANION GAP SERPL CALC-SCNC: 13 MMOL/L (ref 7–16)
AST SERPL-CCNC: 26 U/L (ref 12–45)
BASOPHILS # BLD AUTO: 1.1 % (ref 0–1.8)
BASOPHILS # BLD: 0.04 K/UL (ref 0–0.12)
BILIRUB SERPL-MCNC: 0.7 MG/DL (ref 0.1–1.5)
BUN SERPL-MCNC: 3 MG/DL (ref 8–22)
CALCIUM SERPL-MCNC: 9.1 MG/DL (ref 8.5–10.5)
CHLORIDE SERPL-SCNC: 95 MMOL/L (ref 96–112)
CO2 SERPL-SCNC: 24 MMOL/L (ref 20–33)
CREAT SERPL-MCNC: 0.47 MG/DL (ref 0.5–1.4)
EOSINOPHIL # BLD AUTO: 0.04 K/UL (ref 0–0.51)
EOSINOPHIL NFR BLD: 1.1 % (ref 0–6.9)
ERYTHROCYTE [DISTWIDTH] IN BLOOD BY AUTOMATED COUNT: 40 FL (ref 35.9–50)
FASTING STATUS PATIENT QL REPORTED: NORMAL
GLOBULIN SER CALC-MCNC: 2.7 G/DL (ref 1.9–3.5)
GLUCOSE SERPL-MCNC: 88 MG/DL (ref 65–99)
HCT VFR BLD AUTO: 43.3 % (ref 42–52)
HGB BLD-MCNC: 15.7 G/DL (ref 14–18)
IMM GRANULOCYTES # BLD AUTO: 0.02 K/UL (ref 0–0.11)
IMM GRANULOCYTES NFR BLD AUTO: 0.6 % (ref 0–0.9)
LYMPHOCYTES # BLD AUTO: 0.9 K/UL (ref 1–4.8)
LYMPHOCYTES NFR BLD: 25 % (ref 22–41)
MCH RBC QN AUTO: 32.9 PG (ref 27–33)
MCHC RBC AUTO-ENTMCNC: 36.3 G/DL (ref 33.7–35.3)
MCV RBC AUTO: 90.8 FL (ref 81.4–97.8)
MONOCYTES # BLD AUTO: 0.49 K/UL (ref 0–0.85)
MONOCYTES NFR BLD AUTO: 13.6 % (ref 0–13.4)
NEUTROPHILS # BLD AUTO: 2.11 K/UL (ref 1.82–7.42)
NEUTROPHILS NFR BLD: 58.6 % (ref 44–72)
NRBC # BLD AUTO: 0 K/UL
NRBC BLD-RTO: 0 /100 WBC
PLATELET # BLD AUTO: 216 K/UL (ref 164–446)
PMV BLD AUTO: 8.8 FL (ref 9–12.9)
POTASSIUM SERPL-SCNC: 4.6 MMOL/L (ref 3.6–5.5)
PROT SERPL-MCNC: 7.4 G/DL (ref 6–8.2)
RBC # BLD AUTO: 4.77 M/UL (ref 4.7–6.1)
SODIUM SERPL-SCNC: 132 MMOL/L (ref 135–145)
WBC # BLD AUTO: 3.6 K/UL (ref 4.8–10.8)

## 2021-12-31 PROCEDURE — 80053 COMPREHEN METABOLIC PANEL: CPT

## 2021-12-31 PROCEDURE — 36415 COLL VENOUS BLD VENIPUNCTURE: CPT

## 2021-12-31 PROCEDURE — 85025 COMPLETE CBC W/AUTO DIFF WBC: CPT

## 2022-03-14 ENCOUNTER — HOSPITAL ENCOUNTER (OUTPATIENT)
Dept: RADIOLOGY | Facility: MEDICAL CENTER | Age: 62
End: 2022-03-14
Attending: STUDENT IN AN ORGANIZED HEALTH CARE EDUCATION/TRAINING PROGRAM
Payer: COMMERCIAL

## 2022-03-14 ENCOUNTER — OFFICE VISIT (OUTPATIENT)
Dept: MEDICAL GROUP | Facility: CLINIC | Age: 62
End: 2022-03-14
Payer: COMMERCIAL

## 2022-03-14 VITALS
BODY MASS INDEX: 26.34 KG/M2 | HEIGHT: 67 IN | SYSTOLIC BLOOD PRESSURE: 145 MMHG | HEART RATE: 100 BPM | OXYGEN SATURATION: 100 % | DIASTOLIC BLOOD PRESSURE: 90 MMHG | WEIGHT: 167.8 LBS | RESPIRATION RATE: 16 BRPM

## 2022-03-14 DIAGNOSIS — R22.1 LUMP IN NECK: ICD-10-CM

## 2022-03-14 PROCEDURE — 76536 US EXAM OF HEAD AND NECK: CPT

## 2022-03-14 PROCEDURE — 99213 OFFICE O/P EST LOW 20 MIN: CPT | Mod: GE | Performed by: STUDENT IN AN ORGANIZED HEALTH CARE EDUCATION/TRAINING PROGRAM

## 2022-03-14 ASSESSMENT — FIBROSIS 4 INDEX: FIB4 SCORE: 1.63

## 2022-03-14 NOTE — PROGRESS NOTES
"Subjective:     CC: Lump in neck    HPI:   Misael presents today with the following    Problem   Lump in Neck    Patient presenting today with a persistent sensation of discomfort on the right side of his neck.  He first noticed this in November of last year when he had a URI with associated cough that lasted about 7 weeks.  Patient claims he tested negative for Covid at the time.  He also saw a physician, who mentioned that there was likely reactive lymph node swelling associated with the infection.  Patient is extra concerned due to the fact that his brother suffered from lymphoma several years ago.  He denies any pain associated with the discomfort, and is not convinced that there even is an actual lump that he can feel.  No associated dysphagia or difficulty swallowing.  He has had no systemic symptoms of fevers, weight loss, or night sweats.         Current Outpatient Medications Ordered in Epic   Medication Sig Dispense Refill   • valsartan-hydrochlorothiazide (DIOVAN-HCT) 320-12.5 MG per tablet Take 1 Tab by mouth every evening.       No current Epic-ordered facility-administered medications on file.         Objective:     Exam:  /90 (BP Location: Right arm, Patient Position: Sitting, BP Cuff Size: Adult)   Pulse 100   Resp 16   Ht 1.702 m (5' 7\")   Wt 76.1 kg (167 lb 12.8 oz)   SpO2 100%   BMI 26.28 kg/m²  Body mass index is 26.28 kg/m².    General: Normal appearing. No distress.  HEENT: Normocephalic. Eyes conjunctiva clear lids without ptosis, pupils equal and reactive to light accommodation, ears normal shape and contour, canals are clear bilaterally, tympanic membranes are benign, nasal mucosa benign, oropharynx is without erythema, edema or exudates.   Neck: Supple without JVD or bruit. Thyroid is not enlarged.  Pulmonary: Clear to ausculation.  Normal effort. No rales, ronchi, or wheezing.  Cardiovascular: Regular rate and rhythm without murmur.   Lymph: No cervical or supraclavicular lymph " nodes are palpable, bilateral submandibular lymph nodes appreciated that are non-tender and mobile  Skin: Warm and dry.  No obvious lesions.      Assessment & Plan:     61 y.o. male with the following -     Problem List Items Addressed This Visit     Lump in neck     On physical exam, able to appreciate bilateral submandibular lymph nodes that are normal in size, nontender, and mobile.  There are no concerning masses that I was able to appreciate.  Patient also has no concerning symptoms that he is reporting today.  Will obtain an ultrasound for extra reassurance.         Relevant Orders    US-SOFT TISSUES OF HEAD - NECK            No follow-ups on file.    Jacquelin Vang MD   PGY2

## 2022-03-14 NOTE — ASSESSMENT & PLAN NOTE
On physical exam, able to appreciate bilateral submandibular lymph nodes that are normal in size, nontender, and mobile.  There are no concerning masses that I was able to appreciate.  Patient also has no concerning symptoms that he is reporting today.  Will obtain an ultrasound for extra reassurance.

## 2022-05-15 SDOH — ECONOMIC STABILITY: HOUSING INSECURITY: IN THE LAST 12 MONTHS, HOW MANY PLACES HAVE YOU LIVED?: 1

## 2022-05-15 SDOH — ECONOMIC STABILITY: TRANSPORTATION INSECURITY
IN THE PAST 12 MONTHS, HAS LACK OF RELIABLE TRANSPORTATION KEPT YOU FROM MEDICAL APPOINTMENTS, MEETINGS, WORK OR FROM GETTING THINGS NEEDED FOR DAILY LIVING?: NO

## 2022-05-15 SDOH — HEALTH STABILITY: PHYSICAL HEALTH: ON AVERAGE, HOW MANY DAYS PER WEEK DO YOU ENGAGE IN MODERATE TO STRENUOUS EXERCISE (LIKE A BRISK WALK)?: 5 DAYS

## 2022-05-15 SDOH — ECONOMIC STABILITY: INCOME INSECURITY: IN THE LAST 12 MONTHS, WAS THERE A TIME WHEN YOU WERE NOT ABLE TO PAY THE MORTGAGE OR RENT ON TIME?: NO

## 2022-05-15 SDOH — ECONOMIC STABILITY: FOOD INSECURITY: WITHIN THE PAST 12 MONTHS, YOU WORRIED THAT YOUR FOOD WOULD RUN OUT BEFORE YOU GOT MONEY TO BUY MORE.: NEVER TRUE

## 2022-05-15 SDOH — HEALTH STABILITY: PHYSICAL HEALTH: ON AVERAGE, HOW MANY MINUTES DO YOU ENGAGE IN EXERCISE AT THIS LEVEL?: 40 MIN

## 2022-05-15 SDOH — ECONOMIC STABILITY: TRANSPORTATION INSECURITY
IN THE PAST 12 MONTHS, HAS THE LACK OF TRANSPORTATION KEPT YOU FROM MEDICAL APPOINTMENTS OR FROM GETTING MEDICATIONS?: NO

## 2022-05-15 SDOH — ECONOMIC STABILITY: HOUSING INSECURITY
IN THE LAST 12 MONTHS, WAS THERE A TIME WHEN YOU DID NOT HAVE A STEADY PLACE TO SLEEP OR SLEPT IN A SHELTER (INCLUDING NOW)?: NO

## 2022-05-15 SDOH — HEALTH STABILITY: MENTAL HEALTH
STRESS IS WHEN SOMEONE FEELS TENSE, NERVOUS, ANXIOUS, OR CAN'T SLEEP AT NIGHT BECAUSE THEIR MIND IS TROUBLED. HOW STRESSED ARE YOU?: NOT AT ALL

## 2022-05-15 SDOH — ECONOMIC STABILITY: INCOME INSECURITY: HOW HARD IS IT FOR YOU TO PAY FOR THE VERY BASICS LIKE FOOD, HOUSING, MEDICAL CARE, AND HEATING?: NOT HARD AT ALL

## 2022-05-15 SDOH — ECONOMIC STABILITY: FOOD INSECURITY: WITHIN THE PAST 12 MONTHS, THE FOOD YOU BOUGHT JUST DIDN'T LAST AND YOU DIDN'T HAVE MONEY TO GET MORE.: NEVER TRUE

## 2022-05-15 SDOH — ECONOMIC STABILITY: TRANSPORTATION INSECURITY
IN THE PAST 12 MONTHS, HAS LACK OF TRANSPORTATION KEPT YOU FROM MEETINGS, WORK, OR FROM GETTING THINGS NEEDED FOR DAILY LIVING?: NO

## 2022-05-15 ASSESSMENT — SOCIAL DETERMINANTS OF HEALTH (SDOH)
HOW OFTEN DO YOU GET TOGETHER WITH FRIENDS OR RELATIVES?: MORE THAN THREE TIMES A WEEK
IN A TYPICAL WEEK, HOW MANY TIMES DO YOU TALK ON THE PHONE WITH FAMILY, FRIENDS, OR NEIGHBORS?: MORE THAN THREE TIMES A WEEK
HOW OFTEN DO YOU HAVE A DRINK CONTAINING ALCOHOL: 4 OR MORE TIMES A WEEK
HOW OFTEN DO YOU HAVE SIX OR MORE DRINKS ON ONE OCCASION: MONTHLY
HOW OFTEN DO YOU ATTEND CHURCH OR RELIGIOUS SERVICES?: NEVER
IN A TYPICAL WEEK, HOW MANY TIMES DO YOU TALK ON THE PHONE WITH FAMILY, FRIENDS, OR NEIGHBORS?: MORE THAN THREE TIMES A WEEK
DO YOU BELONG TO ANY CLUBS OR ORGANIZATIONS SUCH AS CHURCH GROUPS UNIONS, FRATERNAL OR ATHLETIC GROUPS, OR SCHOOL GROUPS?: YES
HOW OFTEN DO YOU ATTEND CHURCH OR RELIGIOUS SERVICES?: NEVER
HOW HARD IS IT FOR YOU TO PAY FOR THE VERY BASICS LIKE FOOD, HOUSING, MEDICAL CARE, AND HEATING?: NOT HARD AT ALL
HOW OFTEN DO YOU ATTENT MEETINGS OF THE CLUB OR ORGANIZATION YOU BELONG TO?: MORE THAN 4 TIMES PER YEAR
WITHIN THE PAST 12 MONTHS, YOU WORRIED THAT YOUR FOOD WOULD RUN OUT BEFORE YOU GOT THE MONEY TO BUY MORE: NEVER TRUE
HOW OFTEN DO YOU ATTENT MEETINGS OF THE CLUB OR ORGANIZATION YOU BELONG TO?: MORE THAN 4 TIMES PER YEAR
DO YOU BELONG TO ANY CLUBS OR ORGANIZATIONS SUCH AS CHURCH GROUPS UNIONS, FRATERNAL OR ATHLETIC GROUPS, OR SCHOOL GROUPS?: YES
HOW MANY DRINKS CONTAINING ALCOHOL DO YOU HAVE ON A TYPICAL DAY WHEN YOU ARE DRINKING: 1 OR 2
HOW OFTEN DO YOU GET TOGETHER WITH FRIENDS OR RELATIVES?: MORE THAN THREE TIMES A WEEK

## 2022-05-15 ASSESSMENT — LIFESTYLE VARIABLES
SKIP TO QUESTIONS 9-10: 0
HOW OFTEN DO YOU HAVE SIX OR MORE DRINKS ON ONE OCCASION: MONTHLY
HOW MANY STANDARD DRINKS CONTAINING ALCOHOL DO YOU HAVE ON A TYPICAL DAY: 1 OR 2
HOW OFTEN DO YOU HAVE A DRINK CONTAINING ALCOHOL: 4 OR MORE TIMES A WEEK
AUDIT-C TOTAL SCORE: 6

## 2022-05-18 ENCOUNTER — OFFICE VISIT (OUTPATIENT)
Dept: MEDICAL GROUP | Facility: CLINIC | Age: 62
End: 2022-05-18
Payer: COMMERCIAL

## 2022-05-18 VITALS
HEIGHT: 67 IN | OXYGEN SATURATION: 99 % | HEART RATE: 91 BPM | DIASTOLIC BLOOD PRESSURE: 99 MMHG | BODY MASS INDEX: 26.15 KG/M2 | SYSTOLIC BLOOD PRESSURE: 166 MMHG | WEIGHT: 166.6 LBS

## 2022-05-18 DIAGNOSIS — I10 ESSENTIAL HYPERTENSION: ICD-10-CM

## 2022-05-18 DIAGNOSIS — Z00.00 PREVENTATIVE HEALTH CARE: ICD-10-CM

## 2022-05-18 DIAGNOSIS — Z11.59 NEED FOR HEPATITIS C SCREENING TEST: ICD-10-CM

## 2022-05-18 DIAGNOSIS — E78.2 MIXED HYPERLIPIDEMIA: ICD-10-CM

## 2022-05-18 DIAGNOSIS — R73.01 IMPAIRED FASTING GLUCOSE: ICD-10-CM

## 2022-05-18 PROCEDURE — 99396 PREV VISIT EST AGE 40-64: CPT | Mod: GC | Performed by: STUDENT IN AN ORGANIZED HEALTH CARE EDUCATION/TRAINING PROGRAM

## 2022-05-18 RX ORDER — VALACYCLOVIR HYDROCHLORIDE 1 G/1
TABLET, FILM COATED ORAL
COMMUNITY
Start: 2022-03-29 | End: 2022-08-30

## 2022-05-18 RX ORDER — VALSARTAN AND HYDROCHLOROTHIAZIDE 320; 25 MG/1; MG/1
1 TABLET, FILM COATED ORAL DAILY
Qty: 90 TABLET | Refills: 3 | Status: SHIPPED | OUTPATIENT
Start: 2022-05-18 | End: 2023-06-14 | Stop reason: SDUPTHER

## 2022-05-18 ASSESSMENT — PATIENT HEALTH QUESTIONNAIRE - PHQ9
CLINICAL INTERPRETATION OF PHQ2 SCORE: 1
5. POOR APPETITE OR OVEREATING: 0 - NOT AT ALL
SUM OF ALL RESPONSES TO PHQ QUESTIONS 1-9: 2

## 2022-05-18 ASSESSMENT — FIBROSIS 4 INDEX: FIB4 SCORE: 1.63

## 2022-05-18 NOTE — ASSESSMENT & PLAN NOTE
Poorly controlled, systolic in the 160s today, at home above 130/90 on average.  Will increase dose of hydrochlorothiazide and the combination pill of 12.5 mg to 25 mg.  Patient will MyChart message me in 1 month with his up-to-date blood pressure log.  We will also check CMP to account for electrolyte abnormalities caused by hydrochlorothiazide increased dose.

## 2022-05-18 NOTE — PROGRESS NOTES
Subjective:     CC: Annual    HPI:   Misael presents today with      Problem   Preventative Health Care    Patient here today for his annual wellness visit.  He wants to discuss his blood pressure, get labs, and discussed mild depression.    # Bereavement  Patient states that he recently suffered the death of his son earlier this year.  He states that his son passed away from fungal pneumonia secondary to AIDS.  He states that he has not sought grief counseling and is coping relatively well.  He states that the son moved in a different state and that they did not speak every day.  His support system is his current wife who had known his son since he was a child.  Patient states that his current depression is getting in the way of his motivation to do things that he wants used to enjoy.  Patient states that his depression is not the point where he wants any medication, but he is open to speaking to someone for counseling purposes.    # General wellness  Patient states that he subscribes to a vegan diet and takes B12 every day.  He has no history of smoking cigarettes.  He occasionally drinks beer and wine.  He is requesting a stool test for colon cancer prevention as opposed to a colonoscopy.  He is agreeable to a Cologuard.     Essential Hypertension    At home readings are: 135-145/80s-90. Checks once a week.  Been on this medication for decades, dose increased a decade ago.  No headaches, palpitations.        Htn (Hypertension) (Resolved)       Current Outpatient Medications Ordered in Epic   Medication Sig Dispense Refill   • valacyclovir (VALTREX) 1 GM Tab TAKE 1 TABLET BY MOUTH AS DIRECTED 2 GRAMS AT TIME OF ONSET AND 2 GRAMS 12 HOURS LATER     • valsartan-hydrochlorothiazide (DIOVAN-HCT) 320-25 MG per tablet Take 1 Tablet by mouth every day. 90 Tablet 3     No current Epic-ordered facility-administered medications on file.       Objective:     Exam:  BP (!) 166/99 (BP Location: Left arm, Patient Position:  "Sitting, BP Cuff Size: Adult)   Pulse 91   Ht 1.702 m (5' 7\")   Wt 75.6 kg (166 lb 9.6 oz)   SpO2 99%   BMI 26.09 kg/m²  Body mass index is 26.09 kg/m².    General: Normal appearing. No distress.  HEENT: Normocephalic. Eyes conjunctiva clear lids without ptosis, pupils equal and reactive to light accommodation, ears normal shape and contour, canals are clear bilaterally, tympanic membranes are benign, nasal mucosa benign, oropharynx is without erythema, edema or exudates.   Neck: Supple without JVD or bruit. Thyroid is not enlarged.  Pulmonary: Clear to ausculation.  Normal effort. No rales, ronchi, or wheezing.  Cardiovascular: Regular rate and rhythm without murmur.   Abdomen: Soft, nontender, nondistended. Normal bowel sounds. Liver and spleen are not palpable  Neurologic: Grossly nonfocal  Lymph: No cervical or supraclavicular lymph nodes are palpable  Skin: Warm and dry.  No obvious lesions.  Musculoskeletal: Normal gait. No extremity cyanosis, clubbing, or edema.  Psych: Normal mood and affect. Alert and oriented x3. Judgment and insight is normal.      Assessment & Plan:     61 y.o. male with the following -     Problem List Items Addressed This Visit     Preventative health care     Healthy 61-year-old male with a history of hypertension here for an annual wellness visit.  See essential hypertension assessment and plan for further details there.  Patient is up-to-date on vaccines, including his shingles.  We will do Cologuard for colon cancer screening, patient filled out paperwork today.  Patient has no history of smoking cigarettes.  We will get annual lab work including C MP, vitamin D, lipid profile, and one-time hepatitis C antibody.  Patient is not interested in STD screening.  Patient is also not interested in PSA screening, as he does not have any urinary symptoms at this time, and has no history in the family of prostate cancer.    For grief counseling, we will set up an appointment with Dr. LUBIN" who will direct him to further resources for a longer term solution.           Essential hypertension     Poorly controlled, systolic in the 160s today, at home above 130/90 on average.  Will increase dose of hydrochlorothiazide and the combination pill of 12.5 mg to 25 mg.  Patient will MyChart message me in 1 month with his up-to-date blood pressure log.  We will also check CMP to account for electrolyte abnormalities caused by hydrochlorothiazide increased dose.           Relevant Medications    valsartan-hydrochlorothiazide (DIOVAN-HCT) 320-25 MG per tablet    Other Relevant Orders    Comp Metabolic Panel    Mixed hyperlipidemia    Relevant Medications    valsartan-hydrochlorothiazide (DIOVAN-HCT) 320-25 MG per tablet    Other Relevant Orders    Lipid Profile    HEMOGLOBIN A1C    VITAMIN D 25-HYDROXY    Impaired fasting glucose    Relevant Orders    HEMOGLOBIN A1C      Other Visit Diagnoses     Need for hepatitis C screening test        Relevant Orders    HEP C VIRUS ANTIBODY            No follow-ups on file.    Jacquelin Vang MD   PGY2

## 2022-05-18 NOTE — ASSESSMENT & PLAN NOTE
Healthy 61-year-old male with a history of hypertension here for an annual wellness visit.  See essential hypertension assessment and plan for further details there.  Patient is up-to-date on vaccines, including his shingles.  We will do Cologuard for colon cancer screening, patient filled out paperwork today.  Patient has no history of smoking cigarettes.  We will get annual lab work including C MP, vitamin D, lipid profile, and one-time hepatitis C antibody.  Patient is not interested in STD screening.  Patient is also not interested in PSA screening, as he does not have any urinary symptoms at this time, and has no history in the family of prostate cancer.    For grief counseling, we will set up an appointment with Dr. LUBIN who will direct him to further resources for a longer term solution.

## 2022-06-06 ENCOUNTER — PHARMACY VISIT (OUTPATIENT)
Dept: PHARMACY | Facility: MEDICAL CENTER | Age: 62
End: 2022-06-06
Payer: COMMERCIAL

## 2022-06-06 PROCEDURE — RXMED WILLOW AMBULATORY MEDICATION CHARGE: Performed by: INTERNAL MEDICINE

## 2022-06-06 RX ORDER — CX-024414 0.2 MG/ML
INJECTION, SUSPENSION INTRAMUSCULAR
Qty: 0.25 ML | Refills: 0 | Status: SHIPPED | OUTPATIENT
Start: 2022-06-06 | End: 2023-09-15

## 2022-08-30 PROBLEM — S83.241A ACUTE MEDIAL MENISCAL TEAR, RIGHT, INITIAL ENCOUNTER: Status: ACTIVE | Noted: 2022-08-30

## 2023-01-24 DIAGNOSIS — Z29.89 NEED FOR MALARIA PROPHYLAXIS: ICD-10-CM

## 2023-01-24 DIAGNOSIS — A09 TRAVELER'S DIARRHEA: ICD-10-CM

## 2023-01-24 RX ORDER — AZITHROMYCIN 500 MG/1
500 TABLET, FILM COATED ORAL DAILY
Qty: 3 TABLET | Refills: 0 | Status: SHIPPED | OUTPATIENT
Start: 2023-01-24 | End: 2023-09-15

## 2023-01-24 RX ORDER — ATOVAQUONE AND PROGUANIL HYDROCHLORIDE 250; 100 MG/1; MG/1
TABLET, FILM COATED ORAL
Qty: 17 TABLET | Refills: 0 | Status: SHIPPED | OUTPATIENT
Start: 2023-01-24 | End: 2023-09-15

## 2023-06-14 DIAGNOSIS — I10 ESSENTIAL HYPERTENSION: ICD-10-CM

## 2023-06-14 RX ORDER — VALSARTAN AND HYDROCHLOROTHIAZIDE 320; 25 MG/1; MG/1
1 TABLET, FILM COATED ORAL DAILY
Qty: 90 TABLET | Refills: 3 | Status: SHIPPED | OUTPATIENT
Start: 2023-06-14

## 2023-06-14 RX ORDER — VALSARTAN AND HYDROCHLOROTHIAZIDE 320; 25 MG/1; MG/1
1 TABLET, FILM COATED ORAL DAILY
Qty: 90 TABLET | Refills: 3 | Status: SHIPPED | OUTPATIENT
Start: 2023-06-14 | End: 2023-06-14 | Stop reason: SDUPTHER

## 2023-09-15 ENCOUNTER — HOSPITAL ENCOUNTER (OUTPATIENT)
Dept: LAB | Facility: MEDICAL CENTER | Age: 63
End: 2023-09-15
Payer: COMMERCIAL

## 2023-09-15 ENCOUNTER — OFFICE VISIT (OUTPATIENT)
Dept: URGENT CARE | Facility: CLINIC | Age: 63
End: 2023-09-15
Payer: COMMERCIAL

## 2023-09-15 ENCOUNTER — HOSPITAL ENCOUNTER (OUTPATIENT)
Dept: RADIOLOGY | Facility: MEDICAL CENTER | Age: 63
End: 2023-09-15
Payer: COMMERCIAL

## 2023-09-15 VITALS
OXYGEN SATURATION: 98 % | SYSTOLIC BLOOD PRESSURE: 142 MMHG | HEART RATE: 100 BPM | WEIGHT: 162 LBS | DIASTOLIC BLOOD PRESSURE: 88 MMHG | RESPIRATION RATE: 18 BRPM | TEMPERATURE: 98 F | BODY MASS INDEX: 25.43 KG/M2 | HEIGHT: 67 IN

## 2023-09-15 DIAGNOSIS — R10.32 LLQ PAIN: ICD-10-CM

## 2023-09-15 DIAGNOSIS — K57.92 DIVERTICULITIS: ICD-10-CM

## 2023-09-15 DIAGNOSIS — R10.2 ABDOMINAL PAIN, SUPRAPUBIC: ICD-10-CM

## 2023-09-15 LAB
ALBUMIN SERPL BCP-MCNC: 4.4 G/DL (ref 3.2–4.9)
ALBUMIN/GLOB SERPL: 1.4 G/DL
ALP SERPL-CCNC: 81 U/L (ref 30–99)
ALT SERPL-CCNC: 13 U/L (ref 2–50)
ANION GAP SERPL CALC-SCNC: 12 MMOL/L (ref 7–16)
APPEARANCE UR: CLEAR
AST SERPL-CCNC: 16 U/L (ref 12–45)
BASOPHILS # BLD AUTO: 0.4 % (ref 0–1.8)
BASOPHILS # BLD: 0.04 K/UL (ref 0–0.12)
BILIRUB SERPL-MCNC: 1.4 MG/DL (ref 0.1–1.5)
BILIRUB UR STRIP-MCNC: NORMAL MG/DL
BUN SERPL-MCNC: 6 MG/DL (ref 8–22)
CALCIUM ALBUM COR SERPL-MCNC: 9 MG/DL (ref 8.5–10.5)
CALCIUM SERPL-MCNC: 9.3 MG/DL (ref 8.5–10.5)
CHLORIDE SERPL-SCNC: 86 MMOL/L (ref 96–112)
CO2 SERPL-SCNC: 24 MMOL/L (ref 20–33)
COLOR UR AUTO: NORMAL
CREAT SERPL-MCNC: 0.55 MG/DL (ref 0.5–1.4)
EOSINOPHIL # BLD AUTO: 0.03 K/UL (ref 0–0.51)
EOSINOPHIL NFR BLD: 0.3 % (ref 0–6.9)
ERYTHROCYTE [DISTWIDTH] IN BLOOD BY AUTOMATED COUNT: 38.5 FL (ref 35.9–50)
GFR SERPLBLD CREATININE-BSD FMLA CKD-EPI: 111 ML/MIN/1.73 M 2
GLOBULIN SER CALC-MCNC: 3.1 G/DL (ref 1.9–3.5)
GLUCOSE SERPL-MCNC: 110 MG/DL (ref 65–99)
GLUCOSE UR STRIP.AUTO-MCNC: NEGATIVE MG/DL
HCT VFR BLD AUTO: 40.7 % (ref 42–52)
HGB BLD-MCNC: 14.6 G/DL (ref 14–18)
IMM GRANULOCYTES # BLD AUTO: 0.08 K/UL (ref 0–0.11)
IMM GRANULOCYTES NFR BLD AUTO: 0.8 % (ref 0–0.9)
KETONES UR STRIP.AUTO-MCNC: 15 MG/DL
LEUKOCYTE ESTERASE UR QL STRIP.AUTO: NEGATIVE
LYMPHOCYTES # BLD AUTO: 1.08 K/UL (ref 1–4.8)
LYMPHOCYTES NFR BLD: 11.1 % (ref 22–41)
MCH RBC QN AUTO: 32.4 PG (ref 27–33)
MCHC RBC AUTO-ENTMCNC: 35.9 G/DL (ref 32.3–36.5)
MCV RBC AUTO: 90.2 FL (ref 81.4–97.8)
MONOCYTES # BLD AUTO: 1.28 K/UL (ref 0–0.85)
MONOCYTES NFR BLD AUTO: 13.1 % (ref 0–13.4)
NEUTROPHILS # BLD AUTO: 7.23 K/UL (ref 1.82–7.42)
NEUTROPHILS NFR BLD: 74.3 % (ref 44–72)
NITRITE UR QL STRIP.AUTO: NEGATIVE
NRBC # BLD AUTO: 0 K/UL
NRBC BLD-RTO: 0 /100 WBC (ref 0–0.2)
PH UR STRIP.AUTO: 7 [PH] (ref 5–8)
PLATELET # BLD AUTO: 209 K/UL (ref 164–446)
PMV BLD AUTO: 8.7 FL (ref 9–12.9)
POTASSIUM SERPL-SCNC: 4.5 MMOL/L (ref 3.6–5.5)
PROT SERPL-MCNC: 7.5 G/DL (ref 6–8.2)
PROT UR QL STRIP: NEGATIVE MG/DL
RBC # BLD AUTO: 4.51 M/UL (ref 4.7–6.1)
RBC UR QL AUTO: NEGATIVE
SODIUM SERPL-SCNC: 122 MMOL/L (ref 135–145)
SP GR UR STRIP.AUTO: 1.01
UROBILINOGEN UR STRIP-MCNC: 1 MG/DL
WBC # BLD AUTO: 9.7 K/UL (ref 4.8–10.8)

## 2023-09-15 PROCEDURE — 99214 OFFICE O/P EST MOD 30 MIN: CPT

## 2023-09-15 PROCEDURE — 85025 COMPLETE CBC W/AUTO DIFF WBC: CPT

## 2023-09-15 PROCEDURE — 74177 CT ABD & PELVIS W/CONTRAST: CPT

## 2023-09-15 PROCEDURE — 3079F DIAST BP 80-89 MM HG: CPT

## 2023-09-15 PROCEDURE — 700117 HCHG RX CONTRAST REV CODE 255

## 2023-09-15 PROCEDURE — 3077F SYST BP >= 140 MM HG: CPT

## 2023-09-15 PROCEDURE — 36415 COLL VENOUS BLD VENIPUNCTURE: CPT

## 2023-09-15 PROCEDURE — 81002 URINALYSIS NONAUTO W/O SCOPE: CPT

## 2023-09-15 PROCEDURE — 80053 COMPREHEN METABOLIC PANEL: CPT

## 2023-09-15 RX ORDER — AMOXICILLIN AND CLAVULANATE POTASSIUM 875; 125 MG/1; MG/1
1 TABLET, FILM COATED ORAL 2 TIMES DAILY
Qty: 20 TABLET | Refills: 0 | Status: SHIPPED | OUTPATIENT
Start: 2023-09-15 | End: 2023-09-25

## 2023-09-15 RX ORDER — VALSARTAN AND HYDROCHLOROTHIAZIDE 160; 12.5 MG/1; MG/1
1 TABLET, FILM COATED ORAL
COMMUNITY
End: 2023-09-15

## 2023-09-15 RX ADMIN — IOHEXOL 100 ML: 350 INJECTION, SOLUTION INTRAVENOUS at 15:42

## 2023-09-15 ASSESSMENT — FIBROSIS 4 INDEX: FIB4 SCORE: 1.68

## 2023-09-15 NOTE — PROGRESS NOTES
"Chief Complaint   Patient presents with    Abdominal Pain     X2days, Lower abdominal pain, poss hernia, certain movements are painful         Subjective:   HISTORY OF PRESENT ILLNESS: Misael Mixon is a 63 y.o. male who presents for lower abd pain.  He reports yesterday he woke with this pain and was feeling constipated and was a little hesitant to have a BM due to the pain.  He reports that it is painful to start his stream of urine but once he gets it going, there is no more pain, no starting or stopping of the urine, no dysuria, frequency or urgency    .  He reports he was lifting heavy furniture last week and was concerned for a hernia, he has felt no pouching, he has no inguinal pain, no testicular pain no back pain.  The pain is intermittent when he moves in certain ways.      Denies fevers, penile discharge, testicular pain, n/v.  Reports some diarrhea today.  No hx of diverticulosis, no hx of hernia      Medications, Allergies, current problem list, Social and Family history reviewed today in Epic.     Objective:     BP (!) 142/88 (BP Location: Left arm, Patient Position: Sitting, BP Cuff Size: Adult)   Pulse 100   Temp 36.7 °C (98 °F) (Temporal)   Resp 18   Ht 1.702 m (5' 7\")   Wt 73.5 kg (162 lb)   SpO2 98%     Physical Exam  Vitals reviewed.   Constitutional:       Appearance: Normal appearance.   HENT:      Mouth/Throat:      Mouth: Mucous membranes are moist.   Cardiovascular:      Rate and Rhythm: Normal rate.   Pulmonary:      Effort: Pulmonary effort is normal.   Abdominal:      Tenderness: There is abdominal tenderness in the suprapubic area and left lower quadrant. There is no guarding or rebound. Negative signs include Magaña's sign, Rovsing's sign, McBurney's sign, psoas sign and obturator sign.      Hernia: No hernia is present.      Comments: He is fairly tender over the suprapubic area.  Pain radiates to his left lower quadrant.  No tenderness over McBurney's point.  there is no " inguinal pain no testicular swelling or erythema.  I do not appreciate any hernia.  There are no skin changes.   Skin:     General: Skin is warm and dry.   Neurological:      Mental Status: He is alert and oriented to person, place, and time.   Psychiatric:         Mood and Affect: Mood normal.          Assessment/Plan:     Diagnosis and associated orders    I personally reviewed prior external notes and test results pertinent to today's visit.     1. Diverticulitis  POCT Urinalysis    CT-ABDOMEN-PELVIS WITH    CBC WITH DIFFERENTIAL    Comp Metabolic Panel    Referral back to [facility]      2. Abdominal pain, suprapubic  amoxicillin-clavulanate (AUGMENTIN) 875-125 MG Tab    Referral back to [facility]CP                IMPRESSION: Patient is non-toxic appearing and suitable for outpatient care at this time.  His UA is negative in clinic today.  I have ordered blood work and a STAT CT with contrast to evaluate his symptoms    His blood work is remarkable for hyponatremia at 122, he was trending down last year, no neuro deficits  His WBC count is normal  CT scan shows diverticulitis with possible large diverticula vs abscess.  I called pt and had a long conversation about his results.  He tells me his sodium has been trending down the last few times he has had blood work.  He also states that he has had no increase in pain since this morning.  I have advised him that his results are concerning and he would likely benefit by going to the ER for IV fluids and repeat blood work to trend his WBC.  He feels very strongly that he would like to continue to work this up as an outpt.  I have given him strict ED precautions for any worsening abd pain, fevers, chills, HA or confusion.  I have advised to him push drink oral rehydration fluids such as Pedialyte and have started him on Augmentin.  I would also like him to be closely followed by his PCP and referred him urgently back to primary for re-check.  He is understood that he  can also come back to urgent if he is unable to get in with primary.  He is in agreement with this plan.  I reiterated that if his symptoms progress or persist he should seek care in the ED immediately.     Educated on red flag symptoms and Instructed patient to return to Urgent Care or nearest Emergency Department if symptoms fail to improve, for any change in condition, further concerns, or new concerning symptoms. Patient states understanding of the plan of care and discharge instructions.  They are discharged in stable condition.         Please note that this dictation was created using voice recognition software. I have made a reasonable attempt to correct obvious errors, but I expect that there are errors of grammar and possibly content that I did not discover before finalizing the note.    This note was electronically signed by NEGRO Riojas

## 2023-09-21 ENCOUNTER — OFFICE VISIT (OUTPATIENT)
Dept: MEDICAL GROUP | Facility: CLINIC | Age: 63
End: 2023-09-21
Payer: COMMERCIAL

## 2023-09-21 VITALS
OXYGEN SATURATION: 98 % | HEIGHT: 67 IN | BODY MASS INDEX: 26.95 KG/M2 | SYSTOLIC BLOOD PRESSURE: 138 MMHG | WEIGHT: 171.7 LBS | HEART RATE: 88 BPM | DIASTOLIC BLOOD PRESSURE: 78 MMHG | TEMPERATURE: 97.8 F

## 2023-09-21 DIAGNOSIS — K76.0 FATTY LIVER: ICD-10-CM

## 2023-09-21 DIAGNOSIS — K57.92 DIVERTICULITIS: ICD-10-CM

## 2023-09-21 DIAGNOSIS — E87.1 HYPONATREMIA: ICD-10-CM

## 2023-09-21 PROCEDURE — 3075F SYST BP GE 130 - 139MM HG: CPT

## 2023-09-21 PROCEDURE — 99213 OFFICE O/P EST LOW 20 MIN: CPT | Mod: GC

## 2023-09-21 PROCEDURE — 3078F DIAST BP <80 MM HG: CPT

## 2023-09-21 ASSESSMENT — FIBROSIS 4 INDEX: FIB4 SCORE: 1.36

## 2023-09-21 NOTE — ASSESSMENT & PLAN NOTE
Recommend repeat CMP. If hyponatremia persists, will order urine studies and serum osmolality to further assess.

## 2023-09-21 NOTE — PROGRESS NOTES
"Subjective:     CC: Urgent care follow-up    HPI:   Misael with pmhx essential hypertension, hyperlipidemia, vitamin D deficiency who presents today with:    Problem   Diverticulitis    Patient was diagnosed with diverticulitis about a week ago. He reports that he was experiencing left lower quadrant abdominal pain which had initially started suprapubic. Patient was also experiencing constipation at the time as well. Per chart review, patient was given Augmentin for the diverticulitis and is currently taking the medication. He is on day 6 of 10. His pain has completely subsided. Patient has not had diverticulitis in the past.     Fatty Liver    Patient had incidental findings of fatty liver on recent CT abdomen. He reports a history of drinking several alcoholic drinks daily. He has not experienced withdrawal.     Hyponatremia    Patient had a hyponatremia on [facility] during urgent care visit. His sodium was 122. He denies any diarrhea or vomiting at the time. The patient has a history of hyponatremia but it was typically in the 130s and had never been this low.         Current Outpatient Medications Ordered in Epic   Medication Sig Dispense Refill    VITAMIN D PO Take  by mouth.      valsartan-hydrochlorothiazide (DIOVAN-HCT) 320-25 MG per tablet Take 1 Tablet by mouth every day. 90 Tablet 3    Cyanocobalamin (B-12 PO) Take  by mouth.      amoxicillin-clavulanate (AUGMENTIN) 875-125 MG Tab Take 1 Tablet by mouth 2 times a day for 10 days. (Patient not taking: Reported on 9/21/2023) 20 Tablet 0     No current Epic-ordered facility-administered medications on file.       ROS:  ROS as per HPI. Otherwise negative.      Objective:     Exam:  /78 (BP Location: Left arm, Patient Position: Sitting, BP Cuff Size: Adult)   Pulse 88   Temp 36.6 °C (97.8 °F) (Temporal)   Ht 1.702 m (5' 7\")   Wt 77.9 kg (171 lb 11.2 oz)   SpO2 98%   BMI 26.89 kg/m²  Body mass index is 26.89 kg/m².    Gen: Alert and oriented, No apparent " distress.  Neck: Neck is supple without lymphadenopathy.  Lungs: Normal effort, CTA bilaterally, no wheezes, rhonchi, or rales  CV: Regular rate and rhythm. No murmurs, rubs, or gallops.  Ext: No clubbing, cyanosis, edema.    Labs: None    Assessment & Plan:     63 y.o. male with the following -     Problem List Items Addressed This Visit       Diverticulitis     Currently still finishing course of antibiotics. Colonoscopy was done about 4 years ago. Pain has resolved. Recommend finishing course of antibiotics. Also recommend colonoscopy in 6-8 weeks to assess for colon cancer. Patient would like to think about the colonoscopy.         Fatty liver     Recommend continued alcohol cessation and will reassess with imaging in one year.         Relevant Orders    Comp Metabolic Panel    Lipid Profile    HEMOGLOBIN A1C    Hyponatremia     Recommend repeat CMP. If hyponatremia persists, will order urine studies and serum osmolality to further assess.            I spent a total of 30 minutes with record review, exam, communication with the patient, communication with other providers, and documentation of this encounter.      No follow-ups on file.

## 2023-10-09 ENCOUNTER — HOSPITAL ENCOUNTER (OUTPATIENT)
Dept: LAB | Facility: MEDICAL CENTER | Age: 63
End: 2023-10-09
Payer: COMMERCIAL

## 2023-10-09 DIAGNOSIS — K76.0 FATTY LIVER: ICD-10-CM

## 2023-10-09 LAB
ALBUMIN SERPL BCP-MCNC: 4.6 G/DL (ref 3.2–4.9)
ALBUMIN/GLOB SERPL: 1.6 G/DL
ALP SERPL-CCNC: 73 U/L (ref 30–99)
ALT SERPL-CCNC: 17 U/L (ref 2–50)
ANION GAP SERPL CALC-SCNC: 10 MMOL/L (ref 7–16)
AST SERPL-CCNC: 19 U/L (ref 12–45)
BILIRUB SERPL-MCNC: 0.5 MG/DL (ref 0.1–1.5)
BUN SERPL-MCNC: 5 MG/DL (ref 8–22)
CALCIUM ALBUM COR SERPL-MCNC: 8.3 MG/DL (ref 8.5–10.5)
CALCIUM SERPL-MCNC: 8.8 MG/DL (ref 8.5–10.5)
CHLORIDE SERPL-SCNC: 91 MMOL/L (ref 96–112)
CHOLEST SERPL-MCNC: 199 MG/DL (ref 100–199)
CO2 SERPL-SCNC: 26 MMOL/L (ref 20–33)
CREAT SERPL-MCNC: 0.57 MG/DL (ref 0.5–1.4)
EST. AVERAGE GLUCOSE BLD GHB EST-MCNC: 108 MG/DL
FASTING STATUS PATIENT QL REPORTED: NORMAL
GFR SERPLBLD CREATININE-BSD FMLA CKD-EPI: 109 ML/MIN/1.73 M 2
GLOBULIN SER CALC-MCNC: 2.8 G/DL (ref 1.9–3.5)
GLUCOSE SERPL-MCNC: 103 MG/DL (ref 65–99)
HBA1C MFR BLD: 5.4 % (ref 4–5.6)
HDLC SERPL-MCNC: 41 MG/DL
LDLC SERPL CALC-MCNC: 127 MG/DL
POTASSIUM SERPL-SCNC: 4.6 MMOL/L (ref 3.6–5.5)
PROT SERPL-MCNC: 7.4 G/DL (ref 6–8.2)
SODIUM SERPL-SCNC: 127 MMOL/L (ref 135–145)
TRIGL SERPL-MCNC: 157 MG/DL (ref 0–149)

## 2023-10-09 PROCEDURE — 36415 COLL VENOUS BLD VENIPUNCTURE: CPT

## 2023-10-09 PROCEDURE — 80053 COMPREHEN METABOLIC PANEL: CPT

## 2023-10-09 PROCEDURE — 83036 HEMOGLOBIN GLYCOSYLATED A1C: CPT

## 2023-10-09 PROCEDURE — 80061 LIPID PANEL: CPT

## 2023-10-11 DIAGNOSIS — E87.1 HYPONATREMIA: ICD-10-CM

## 2023-10-12 ENCOUNTER — HOSPITAL ENCOUNTER (OUTPATIENT)
Dept: LAB | Facility: MEDICAL CENTER | Age: 63
End: 2023-10-12
Payer: COMMERCIAL

## 2023-10-12 DIAGNOSIS — E87.1 HYPONATREMIA: ICD-10-CM

## 2023-10-12 LAB
OSMOLALITY SERPL: 263 MOSM/KG H2O (ref 278–298)
OSMOLALITY UR: 115 MOSM/KG H2O (ref 300–900)

## 2023-10-12 PROCEDURE — 83935 ASSAY OF URINE OSMOLALITY: CPT

## 2023-10-12 PROCEDURE — 83930 ASSAY OF BLOOD OSMOLALITY: CPT

## 2023-10-12 PROCEDURE — 36415 COLL VENOUS BLD VENIPUNCTURE: CPT

## 2024-02-02 DIAGNOSIS — R53.83 OTHER FATIGUE: ICD-10-CM

## 2024-02-05 ENCOUNTER — HOSPITAL ENCOUNTER (OUTPATIENT)
Dept: LAB | Facility: MEDICAL CENTER | Age: 64
End: 2024-02-05
Payer: COMMERCIAL

## 2024-02-05 DIAGNOSIS — R53.83 OTHER FATIGUE: ICD-10-CM

## 2024-02-05 LAB
TESTOST SERPL-MCNC: 257 NG/DL (ref 175–781)
TSH SERPL DL<=0.005 MIU/L-ACNC: 2.38 UIU/ML (ref 0.38–5.33)

## 2024-02-05 PROCEDURE — 36415 COLL VENOUS BLD VENIPUNCTURE: CPT

## 2024-02-05 PROCEDURE — 84443 ASSAY THYROID STIM HORMONE: CPT

## 2024-02-05 PROCEDURE — 84403 ASSAY OF TOTAL TESTOSTERONE: CPT

## 2024-03-01 ENCOUNTER — PATIENT MESSAGE (OUTPATIENT)
Dept: HEALTH INFORMATION MANAGEMENT | Facility: OTHER | Age: 64
End: 2024-03-01

## 2024-06-24 DIAGNOSIS — I10 ESSENTIAL HYPERTENSION: ICD-10-CM

## 2024-06-24 RX ORDER — VALSARTAN AND HYDROCHLOROTHIAZIDE 320; 25 MG/1; MG/1
1 TABLET, FILM COATED ORAL DAILY
Qty: 90 TABLET | Refills: 3 | Status: SHIPPED | OUTPATIENT
Start: 2024-06-24

## 2024-12-02 SDOH — ECONOMIC STABILITY: INCOME INSECURITY: IN THE LAST 12 MONTHS, WAS THERE A TIME WHEN YOU WERE NOT ABLE TO PAY THE MORTGAGE OR RENT ON TIME?: NO

## 2024-12-02 SDOH — ECONOMIC STABILITY: INCOME INSECURITY: HOW HARD IS IT FOR YOU TO PAY FOR THE VERY BASICS LIKE FOOD, HOUSING, MEDICAL CARE, AND HEATING?: NOT HARD AT ALL

## 2024-12-02 SDOH — HEALTH STABILITY: PHYSICAL HEALTH: ON AVERAGE, HOW MANY DAYS PER WEEK DO YOU ENGAGE IN MODERATE TO STRENUOUS EXERCISE (LIKE A BRISK WALK)?: 5 DAYS

## 2024-12-02 SDOH — HEALTH STABILITY: PHYSICAL HEALTH: ON AVERAGE, HOW MANY MINUTES DO YOU ENGAGE IN EXERCISE AT THIS LEVEL?: 30 MIN

## 2024-12-02 SDOH — ECONOMIC STABILITY: FOOD INSECURITY: WITHIN THE PAST 12 MONTHS, YOU WORRIED THAT YOUR FOOD WOULD RUN OUT BEFORE YOU GOT MONEY TO BUY MORE.: NEVER TRUE

## 2024-12-02 SDOH — ECONOMIC STABILITY: FOOD INSECURITY: WITHIN THE PAST 12 MONTHS, THE FOOD YOU BOUGHT JUST DIDN'T LAST AND YOU DIDN'T HAVE MONEY TO GET MORE.: NEVER TRUE

## 2024-12-02 ASSESSMENT — LIFESTYLE VARIABLES
HOW OFTEN DO YOU HAVE SIX OR MORE DRINKS ON ONE OCCASION: LESS THAN MONTHLY
AUDIT-C TOTAL SCORE: 6
HOW OFTEN DO YOU HAVE A DRINK CONTAINING ALCOHOL: 4 OR MORE TIMES A WEEK
HOW MANY STANDARD DRINKS CONTAINING ALCOHOL DO YOU HAVE ON A TYPICAL DAY: 3 OR 4
SKIP TO QUESTIONS 9-10: 0

## 2024-12-02 ASSESSMENT — SOCIAL DETERMINANTS OF HEALTH (SDOH)
HOW OFTEN DO YOU HAVE A DRINK CONTAINING ALCOHOL: 4 OR MORE TIMES A WEEK
DO YOU BELONG TO ANY CLUBS OR ORGANIZATIONS SUCH AS CHURCH GROUPS UNIONS, FRATERNAL OR ATHLETIC GROUPS, OR SCHOOL GROUPS?: YES
HOW MANY DRINKS CONTAINING ALCOHOL DO YOU HAVE ON A TYPICAL DAY WHEN YOU ARE DRINKING: 3 OR 4
HOW OFTEN DO YOU GET TOGETHER WITH FRIENDS OR RELATIVES?: MORE THAN THREE TIMES A WEEK
HOW OFTEN DO YOU ATTENT MEETINGS OF THE CLUB OR ORGANIZATION YOU BELONG TO?: MORE THAN 4 TIMES PER YEAR
IN A TYPICAL WEEK, HOW MANY TIMES DO YOU TALK ON THE PHONE WITH FAMILY, FRIENDS, OR NEIGHBORS?: MORE THAN THREE TIMES A WEEK
HOW OFTEN DO YOU ATTEND CHURCH OR RELIGIOUS SERVICES?: NEVER
IN A TYPICAL WEEK, HOW MANY TIMES DO YOU TALK ON THE PHONE WITH FAMILY, FRIENDS, OR NEIGHBORS?: MORE THAN THREE TIMES A WEEK
HOW OFTEN DO YOU ATTEND CHURCH OR RELIGIOUS SERVICES?: NEVER
WITHIN THE PAST 12 MONTHS, YOU WORRIED THAT YOUR FOOD WOULD RUN OUT BEFORE YOU GOT THE MONEY TO BUY MORE: NEVER TRUE
HOW OFTEN DO YOU ATTENT MEETINGS OF THE CLUB OR ORGANIZATION YOU BELONG TO?: MORE THAN 4 TIMES PER YEAR
HOW OFTEN DO YOU HAVE SIX OR MORE DRINKS ON ONE OCCASION: LESS THAN MONTHLY
IN THE PAST 12 MONTHS, HAS THE ELECTRIC, GAS, OIL, OR WATER COMPANY THREATENED TO SHUT OFF SERVICE IN YOUR HOME?: NO
DO YOU BELONG TO ANY CLUBS OR ORGANIZATIONS SUCH AS CHURCH GROUPS UNIONS, FRATERNAL OR ATHLETIC GROUPS, OR SCHOOL GROUPS?: YES
HOW OFTEN DO YOU GET TOGETHER WITH FRIENDS OR RELATIVES?: MORE THAN THREE TIMES A WEEK
HOW HARD IS IT FOR YOU TO PAY FOR THE VERY BASICS LIKE FOOD, HOUSING, MEDICAL CARE, AND HEATING?: NOT HARD AT ALL

## 2024-12-09 ENCOUNTER — OFFICE VISIT (OUTPATIENT)
Dept: MEDICAL GROUP | Facility: MEDICAL CENTER | Age: 64
End: 2024-12-09
Attending: FAMILY MEDICINE
Payer: MEDICARE

## 2024-12-09 VITALS
HEIGHT: 67 IN | WEIGHT: 163.7 LBS | BODY MASS INDEX: 25.69 KG/M2 | DIASTOLIC BLOOD PRESSURE: 89 MMHG | SYSTOLIC BLOOD PRESSURE: 146 MMHG | TEMPERATURE: 95 F | OXYGEN SATURATION: 100 % | HEART RATE: 96 BPM | RESPIRATION RATE: 16 BRPM

## 2024-12-09 DIAGNOSIS — R93.3 ABNORMAL FINDINGS ON DIAGNOSTIC IMAGING OF OTHER PARTS OF DIGESTIVE TRACT: ICD-10-CM

## 2024-12-09 DIAGNOSIS — Z78.9 VEGAN DIET: ICD-10-CM

## 2024-12-09 DIAGNOSIS — I10 ESSENTIAL HYPERTENSION: ICD-10-CM

## 2024-12-09 DIAGNOSIS — R25.2 MUSCLE CRAMPING: ICD-10-CM

## 2024-12-09 DIAGNOSIS — F10.90 ALCOHOL USE DISORDER: ICD-10-CM

## 2024-12-09 DIAGNOSIS — Z11.4 SCREENING FOR HIV WITHOUT PRESENCE OF RISK FACTORS: ICD-10-CM

## 2024-12-09 DIAGNOSIS — I10 HYPERTENSION, UNSPECIFIED TYPE: ICD-10-CM

## 2024-12-09 DIAGNOSIS — Z11.59 NEED FOR HEPATITIS C SCREENING TEST: ICD-10-CM

## 2024-12-09 DIAGNOSIS — Z91.81 RISK FOR FALLS: ICD-10-CM

## 2024-12-09 DIAGNOSIS — E55.9 VITAMIN D DEFICIENCY: ICD-10-CM

## 2024-12-09 DIAGNOSIS — Z86.39 H/O HYPERLIPIDEMIA: ICD-10-CM

## 2024-12-09 DIAGNOSIS — Z91.89 AT RISK FOR SLEEP APNEA: ICD-10-CM

## 2024-12-09 DIAGNOSIS — Z71.1 CONCERN ABOUT MEMORY: ICD-10-CM

## 2024-12-09 DIAGNOSIS — N52.9 ERECTILE DYSFUNCTION, UNSPECIFIED ERECTILE DYSFUNCTION TYPE: ICD-10-CM

## 2024-12-09 DIAGNOSIS — K76.0 FATTY LIVER: ICD-10-CM

## 2024-12-09 PROCEDURE — 99212 OFFICE O/P EST SF 10 MIN: CPT | Performed by: FAMILY MEDICINE

## 2024-12-09 PROCEDURE — 3077F SYST BP >= 140 MM HG: CPT | Performed by: FAMILY MEDICINE

## 2024-12-09 PROCEDURE — 99214 OFFICE O/P EST MOD 30 MIN: CPT | Performed by: FAMILY MEDICINE

## 2024-12-09 PROCEDURE — 3079F DIAST BP 80-89 MM HG: CPT | Performed by: FAMILY MEDICINE

## 2024-12-09 ASSESSMENT — FIBROSIS 4 INDEX: FIB4 SCORE: 1.43

## 2024-12-09 ASSESSMENT — PATIENT HEALTH QUESTIONNAIRE - PHQ9: CLINICAL INTERPRETATION OF PHQ2 SCORE: 0

## 2024-12-09 NOTE — PROGRESS NOTES
Verbal consent was acquired by the patient to use [employer] ambient listening note generation during this visit.    Subjective:     CC:    Chief Complaint   Patient presents with    Establish Care     Discuss about sleep apean    Medication Problem    Cramping     Leg and hands       HISTORY OF THE PRESENT ILLNESS: Misael Mixon is a 64 y.o. male. This pleasant patient is here today to establish primary care with me and discuss the following issues.     Specialists   Ortho    History of Present Illness  The patient presents for the establishment of care. He is accompanied by his wife Domi.    He has been under the care of residents at [facility], necessitating a change every 3 to 4 years. He follows a mostly vegan diet, occasionally consuming fish, and avoids red meat and processed foods. He acknowledges the need to reduce his alcohol intake and increase his physical activity. He consumes a minimum of 7 glasses of wine or beer per week, sometimes drinking an entire bottle of wine. His alcohol consumption increased after his son's death in 2012. He has experienced blackouts but has never been hospitalized for them. He has tried marijuana in the past but does not currently use it. He has never used vaping or electronic devices, traditional cigarettes, tobacco, or nicotine. 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 He has three sons, one of whom passed away at 6 months old from \[facility] [place] [facility]\"". He has experienced stress due to his son's living situation and has sought therapy for this. He has never felt suicidal.    He has been diagnosed with impingement in both shoulders and is currently undergoing physical therapy, with his last session scheduled for tomorrow. He has been advised by his orthopedic doctor that he may require shoulder replacement surgery, but he hopes to avoid this. He underwent orthopedic surgery in 2011 and 2022, tonsil removal in 2022, and right knee  surgery in 2022. He fell while playing ping-pong a few weeks ago, injuring his hip and knees.    He reports that his blood pressure medication does not seem to be effective, as his readings are typically in the 150s. His previous primary care provider had increased his medication dosage to 320 or 325 two years ago. Over the past two years, he has experienced a decrease in electrolytes, erectile dysfunction, and a lack of interest, which he attributes to side effects of his medication. He also reports cramping in his hands and left calf over the past few weeks. He reports no symptoms when his blood pressure is high.    He has concerns about sleep apnea, as he has noticed episodes of stopped breathing during sleep, even when sleeping on his side. His wife has observed him using incorrect words frequently. He admits to feeling tired during the day and taking occasional naps. His wife notes a decrease in his energy levels and increased sedentary behavior. He occasionally uses incorrect words without realizing it. He sleeps for 7 to 8 hours per night, waking up once to use the bathroom. He reports no issues with urination.    He has a history of high cholesterol and fatty liver, which was confirmed by a recent MRI. He is frustrated by his erectile dysfunction and lack of interest.    FAMILY HISTORY  His mother had dementia and TIAs. His father had stroke. His brother had lymphoma and is in remission for 5 years. He has mild cognitive impairment. His son is being treated for depression.    ALLERGIES  He is allergic to SILVAQUIN, SULFA, and ATOVAQUONE, which gives him rashes and hives.    IMMUNIZATIONS  He is up to date on his influenza vaccine, COVID-19 booster, and pneumonia vaccine.      Allergies: Atovaquone-proguanil hcl and Sulfa drugs      [facility] - [place], [place] - [address]  [address] [address]  Phone: [phone] Fax: [phone]    [facility]/pharmacy #[id number] -  "[place], [place] - [address]  [address] [place] [address]  Phone: [phone] Fax: [phone]      Current Outpatient Medications   Medication Sig Dispense Refill    valsartan-hydrochlorothiazide (DIOVAN-HCT) 320-25 MG per tablet Take 1 Tablet by mouth every day. 90 Tablet 3    VITAMIN D PO Take  by mouth.      Cyanocobalamin (B-12 PO) Take  by mouth.       No current facility-administered medications for this visit.       Past Medical History:   Diagnosis Date    Hyperlipidemia     Hypertension        Past Surgical History:   Procedure Laterality Date    PB KNEE SCOPE,MED/LAT MENISECTOMY Right 9/7/2022    Procedure: RIGHT KNEE ARTHROSCOPY PARTIAL MEDIAL MENISCECTOMY, REPAIRS AS INDICATED;  Surgeon: Zoe Naidu M.D.;  Location: [facility];  Service: Orthopedics    IRRIGATION & DEBRIDEMENT ORTHO  7/13/2011    Performed by KAY BYNUM at [facility]    OTHER      tonsills    TONSILLECTOMY         Family History   Problem Relation Age of Onset    Psychiatric Illness Mother         MDD    Stroke Mother         TIA    Cancer Father     Hypertension Father     Stroke Father     Lung Disease Father     Cancer Brother         Lymphoma    Psychiatric Illness Son         MDD       Social History     Tobacco Use    Smoking status: Never    Smokeless tobacco: Never   Vaping Use    Vaping status: Never Used   Substance Use Topics    Alcohol use: Yes     Alcohol/week: 8.4 oz     Types: 7 Glasses of wine, 7 Cans of beer per week     Comment: Daily since 2012; denies DT    Drug use: No     Comment: Tried Marijuana in past         Objective:     BP (!) 146/89 (BP Location: Left arm, Patient Position: Sitting, BP Cuff Size: Adult)   Pulse 96   Temp (!) 35 °C (95 °F) (Temporal)   Resp 16   Ht 1.702 m (5' 7\")   Wt 74.3 kg (163 lb 11.2 oz)   SpO2 100%   BMI 25.64 kg/m²   Physical Exam  Constitutional: Alert, no distress  Skin: No rashes in visible areas  Eye: Conjunctiva clear, " lids normal  Respiratory: Unlabored respiratory effort on room air, no cough, lungs clear to auscultation bilaterally  CV: RRR  MSK: Normal gait, moves all extremities  Psych: Alert and oriented x3, normal affect and mood      Assessment & Plan:   64 y.o. male with the following -    1. Hypertension, unspecified type        2. Erectile dysfunction, unspecified erectile dysfunction type        3. H/O hyperlipidemia  HEMOGLOBIN A1C    Lipid Profile      4. At risk for sleep apnea  Referral to Pulmonary and Sleep Medicine      5. Alcohol use disorder  Referral to Behavioral Health      6. Muscle cramping        7. Concern about memory        8. Vitamin D deficiency  VITAMIN D,25 HYDROXY (DEFICIENCY)      9. Vegan diet  VITAMIN B12      10. Essential hypertension  Comp Metabolic Panel    CBC WITH DIFFERENTIAL      11. Need for hepatitis C screening test  HEP C VIRUS ANTIBODY      12. Screening for HIV without presence of risk factors  HIV AG/AB COMBO ASSAY SCREENING      13. Abnormal findings on diagnostic imaging of other parts of digestive tract  Lipid Profile      14. Fatty liver        15. Risk for falls  Patient identified as fall risk.  Appropriate orders and counseling given.        Assessment & Plan  1. Hypertension.  His blood pressure readings are consistently in the 150s, despite being on the maximum dose of Diovan (valsartan and hydrochlorothiazide). He is advised to record his blood pressure readings more consistently. Updated labs, including blood count, metabolic panel, cholesterol, A1c, vitamin D, and B12 levels, have been ordered. He is advised to adjust his diet and gradually reduce alcohol consumption to help manage his blood pressure.    2. Erectile Dysfunction.  He reports erectile dysfunction and lack of interest, which may be side effects of his current medication or related to his alcohol use. This will be further evaluated once the updated lab results are available.    3. Sleep Apnea.  He is at  high risk for sleep apnea, with symptoms including apneic events and snoring. A referral for a sleep medicine consultation has been placed to confirm the diagnosis.    4. Alcohol Use Disorder.  He consumes 3-4 glasses of wine or beer daily, which has increased since his son's death. He is advised to gradually reduce alcohol consumption and consider behavioral health support. A referral for cognitive behavioral therapy has been placed to help manage his mental health and provide tools for reducing alcohol use.    5. Leg Cramping.  He experiences cramping in his left calf and hands, which may be related to poor sleep quality. This will be monitored, and any necessary adjustments will be made based on the lab results and sleep study findings.    6. Cognitive Concerns.  He occasionally uses the wrong word without noticing, which may be related to poor sleep quality. This will be further evaluated during the Medicare annual well visit.    7. Health Maintenance.  A comprehensive discussion regarding lifestyle medicine was conducted with the patient, emphasizing the importance of maintaining a healthy plant based diet, engaging in regular physical activity, maintaining healthy social relationships, managing stress, ensuring adequate restful sleep, and avoiding risky substances such as alcohol, drugs, and tobacco. Updated labs, including tests for HIV, hepatitis C, blood count, metabolic panel, cholesterol, A1c, vitamin D, and B12 levels, have been ordered. He is advised to ensure his home environment is safe to prevent falls. He is up to date on his flu vaccine and COVID booster.    Follow-up  Return in 4 to 6 weeks for follow up.      Return in about 4 weeks (around 1/6/2025) for chronic condition follow up.    Please note that this dictation was created using voice recognition software. I have made every reasonable attempt to correct obvious errors, but I expect that there are errors of grammar and possibly content that  I did not discover before finalizing the note.

## 2024-12-13 ENCOUNTER — OFFICE VISIT (OUTPATIENT)
Dept: SLEEP MEDICINE | Facility: MEDICAL CENTER | Age: 64
End: 2024-12-13
Attending: FAMILY MEDICINE
Payer: MEDICARE

## 2024-12-13 VITALS
DIASTOLIC BLOOD PRESSURE: 92 MMHG | HEART RATE: 91 BPM | SYSTOLIC BLOOD PRESSURE: 140 MMHG | HEIGHT: 67 IN | WEIGHT: 160 LBS | OXYGEN SATURATION: 99 % | BODY MASS INDEX: 25.11 KG/M2 | RESPIRATION RATE: 16 BRPM

## 2024-12-13 DIAGNOSIS — R06.81 WITNESSED EPISODE OF APNEA: ICD-10-CM

## 2024-12-13 DIAGNOSIS — Z91.89 AT RISK FOR SLEEP APNEA: ICD-10-CM

## 2024-12-13 DIAGNOSIS — G47.30 SLEEP DISORDER BREATHING: Primary | ICD-10-CM

## 2024-12-13 DIAGNOSIS — R06.83 SNORING: ICD-10-CM

## 2024-12-13 PROCEDURE — 3080F DIAST BP >= 90 MM HG: CPT | Performed by: STUDENT IN AN ORGANIZED HEALTH CARE EDUCATION/TRAINING PROGRAM

## 2024-12-13 PROCEDURE — 99212 OFFICE O/P EST SF 10 MIN: CPT | Performed by: FAMILY MEDICINE

## 2024-12-13 PROCEDURE — 3077F SYST BP >= 140 MM HG: CPT | Performed by: STUDENT IN AN ORGANIZED HEALTH CARE EDUCATION/TRAINING PROGRAM

## 2024-12-13 PROCEDURE — 99204 OFFICE O/P NEW MOD 45 MIN: CPT | Performed by: STUDENT IN AN ORGANIZED HEALTH CARE EDUCATION/TRAINING PROGRAM

## 2024-12-13 ASSESSMENT — FIBROSIS 4 INDEX: FIB4 SCORE: 1.43

## 2024-12-13 NOTE — PROGRESS NOTES
[facility] Consult Note     Date: 12/13/2024 / Time: 12:30 PM      Thank you for requesting a sleep medicine consultation on Misael Mixon at the sleep center. Presents today with the chief complaints of snoring, witnessed apneas, choking in sleep. He is referred by Mireya Pizano M.D.  [address] [place] [address] for evaluation and treatment of sleep disorder breathing .     HISTORY OF PRESENT ILLNESS:     Misael Mixon is a 64 y.o. male with hypertension, snoring.  Presents to Sleep Clinic for evaluation of sleep.     He reports he has snored in his sleep for years.  His wife is concerned that he stops breathing in his sleep.  He has had episodes of choking and gasping when laying on his back.  He feels when he sleeps on his side there are episodes where he stops breathing in the night.  He is concern for potential obstructive sleep apnea and central sleep apnea.  He generally has no trouble falling asleep or staying asleep.    He has been told by his wife that he seems to have less energy during the day.  Denies any sleepiness while driving.    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  He is aware if diagnosed with sleep apnea requirements from the FAA.    As per supplemental questionnaire to be scanned or imported into chart:    Eagle Sleepiness Score: 3    Sleep Schedule  Bedtime: Weekday & Weekend 10pm   Wake time: Weekday & Weekend 6-7am  Sleep-onset latency: 5 min   Awakenings from sleep: 1  Difficulty falling back asleep: No  Bedroom partner: yes  Naps: once a week     DAYTIME SYMPTOMS:   Excessive daytime sleepiness: No   Daytime fatigue: maybe less energy   Difficulty concentrating: No   Memory problems: No   Irritability:No   Work/school performance issues: No   Sleepiness with driving: No   Caffeine/stimulant use: No   Alcohol use:Yes, How Many? Daily      SLEEP  "RELATED SYMPTOMS  Snoring: Yes  Witnessed apnea or gasping/choking: Yes  Dry mouth or mouth breathing: No   Sweating: No   Teeth grinding/biting: No   Morning headaches: No   Refreshed Upon Awakening: Yes     SLEEP RELATED BEHAVIORS:  Parasomnias (walking, talking, eating, violence): No   Leg kicking: No   Restless legs - \"urge to move\": No   Nightmares: No  Recurrent: No   Dream enactment: No      NARCOLEPSY:  Cataplexy: No   Sleep paralysis: No   Sleep attacks: No   Hypnagogic/hypnopompic hallucinations: No     MEDICAL HISTORY  Past Medical History:   Diagnosis Date    Apnea, sleep     Daytime sleepiness     Gasping for breath     Hyperlipidemia     Hypertension     Snoring         SURGICAL HISTORY  Past Surgical History:   Procedure Laterality Date    PB KNEE SCOPE,MED/LAT MENISECTOMY Right 9/7/2022    Procedure: RIGHT KNEE ARTHROSCOPY PARTIAL MEDIAL MENISCECTOMY, REPAIRS AS INDICATED;  Surgeon: Zoe Naidu M.D.;  Location: [facility];  Service: Orthopedics    IRRIGATION & DEBRIDEMENT ORTHO  7/13/2011    Performed by KAY BYNUM at [facility]    OTHER      tonsills    TONSILLECTOMY          FAMILY HISTORY  Family History   Problem Relation Age of Onset    Psychiatric Illness Mother         MDD    Stroke Mother         TIA    Cancer Father     Hypertension Father     Stroke Father     Lung Disease Father     Cancer Brother         Lymphoma    Psychiatric Illness Son         MDD       SOCIAL HISTORY  Social History     Socioeconomic History    Marital status:     Number of children: 2    Highest education level: Some college, no degree   Occupational History     Comment:    Tobacco Use    Smoking status: Never    Smokeless tobacco: Never   Vaping Use    Vaping status: Never Used   Substance and Sexual Activity    Alcohol use: Yes     Alcohol/week: 8.4 oz     Types: 7 Glasses of wine, 7 Cans of beer per week     Comment: Daily since 2012; denies DT    " Drug use: No     Comment: Tried Marijuana in past    Sexual activity: Yes     Partners: Female     Birth control/protection: None   Social History Narrative    Lives with wife Domi and their 2 cats     Social Drivers of Health     Financial Resource Strain: Low Risk  (12/2/2024)    Overall Financial Resource Strain (CARDIA)     Difficulty of Paying Living Expenses: Not hard at all   Food Insecurity: No Food Insecurity (12/2/2024)    Hunger Vital Sign     Worried About Running Out of Food in the Last Year: Never true     Ran Out of Food in the Last Year: Never true   Transportation Needs: No Transportation Needs (12/2/2024)    PRAPARE - Transportation     Lack of Transportation (Medical): No     Lack of Transportation (Non-Medical): No   Physical Activity: Sufficiently Active (12/2/2024)    Exercise Vital Sign     Days of Exercise per Week: 5 days     Minutes of Exercise per Session: 30 min   Stress: No Stress Concern Present (12/2/2024)    [place] [employer] - Occupational Stress Questionnaire     Feeling of Stress : Not at all   Social Connections: Moderately Integrated (12/2/2024)    Social Connection and Isolation Panel [NHANES]     Frequency of Communication with Friends and Family: More than three times a week     Frequency of Social Gatherings with Friends and Family: More than three times a week     Attends Shinto Services: Never     Active Member of Clubs or Organizations: Yes     Attends Club or Organization Meetings: More than 4 times per year     Marital Status:    Housing Stability: Low Risk  (12/2/2024)    Housing Stability Vital Sign     Unable to Pay for Housing in the Last Year: No     Number of Times Moved in the Last Year: 0     Homeless in the Last Year: No        Occupation: Retired    CURRENT MEDICATIONS  Current Outpatient Medications   Medication Sig Dispense Refill    valsartan-hydrochlorothiazide (DIOVAN-HCT) 320-25 MG per tablet Take 1 Tablet by mouth every  "day. 90 Tablet 3    VITAMIN D PO Take  by mouth.      Cyanocobalamin (B-12 PO) Take  by mouth.       No current facility-administered medications for this visit.       REVIEW OF SYSTEMS  Constitutional: Denies fevers, Denies weight changes  Ears/Nose/Throat/Mouth: Denies nasal congestion or sore throat   Cardiovascular: Denies chest pain  Respiratory: Denies shortness of breath, Denies cough  Gastrointestinal/Hepatic: Denies nausea, vomiting  Sleep: see HPI    Physical Examination:  Vitals/ General Appearance:   Weight/BMI: Body mass index is 25.06 kg/m².  BP (!) 140/92 (BP Location: Left arm, Patient Position: Sitting, BP Cuff Size: Adult)   Pulse 91   Resp 16   Ht 1.702 m (5' 7\")   Wt 72.6 kg (160 lb)   SpO2 99%   Vitals:    12/13/24 1230   BP: (!) 140/92   BP Location: Left arm   Patient Position: Sitting   BP Cuff Size: Adult   Pulse: 91   Resp: 16   SpO2: 99%   Weight: 72.6 kg (160 lb)   Height: 1.702 m (5' 7\")       Pt. is alert and oriented to time, place and person. Cooperative and in no apparent distress.     Constitutional: Alert, no distress, well-groomed.  Skin: No rashes in visible areas.  Eye: Round. Conjunctiva clear, lids normal. No icterus.   ENT EXAM  Nasal alae/valves collapsible: No   Nasal septum deviation: Yes  Nasal turbinate hypertrophy: No  Hard palate narrow: No   Hard palate high: No   Soft palate/uvula (Mallampati score): 4  Tongue Scalloping: Yes  Retrognathia: No   Micrognathia: No   Cardiovascular:no murmus/gallops/rubs, normal S1 and S2 heart sounds, regular rate and rhythm  Pulmonary:Clear to auscultation, No wheezes, No crackles.  Neurologic:Awake, alert and oriented x 3, Normal age appropriate gait, No involuntary motions.  Extremities: No clubbing, cyanosis, or edema       ASSESSMENT AND PLAN   Misael Mixon is a 64 y.o. male with hypertension, snoring.  Presents to Sleep Clinic for evaluation of sleep.     Misael Mixon  has symptoms of Obstructive Sleep " Apnea (CRESENCIO). Misael Mixon has symptoms of snoring, choking/gasping during sleep, witnessed apnea.  Pt has risk factors for CRESENCIO include age and crowded oropharynx.     The pathophysiology of CRESENCIO and the increased risk of cardiovascular morbidity from untreated CRESENCIO is discussed in detail with the patient. He  also has HTN,  which can be worsened by CRESENCIO.  Discussed differences between obstructive sleep apnea and central sleep apnea.  Advised that he if he would like to be evaluated for central sleep apnea and the recommended study would be a in lab sleep study.    We have discussed diagnostic options including in-laboratory, attended polysomnography and home sleep testing. We have also discussed treatment options including airway pressurization, reconstructive otolaryngologic surgery, dental appliances and weight management.     Subsequently,treatment options will be discussed based on the diagnostic study. Meanwhile, He is urged to avoid supine sleep, weight gain and alcoholic beverages since all of these can worsen CRESENCIO. He is cautioned against drowsy driving. If He feels sleepy while driving, advised must pull over for a break/nap, rather than persist on the road, in the interest of Pt's own safety and that of others on the road.    Plan  -  He  will be scheduled for an overnight  HST to assess sleep related breathing disorder.  - Follow up 1-2 weeks after sleep study to discuss results and treatment options moving forward   -Advised to reach out via [employer] or by phone with any questions or concerns.       Please note portions of this record was created using voice recognition software. I have made every reasonable attempt to correct obvious errors, but I expect that there are errors of grammar and possibly content I did not discover before finalizing the note.

## 2024-12-31 ENCOUNTER — PATIENT MESSAGE (OUTPATIENT)
Dept: SLEEP MEDICINE | Facility: MEDICAL CENTER | Age: 64
End: 2024-12-31
Payer: MEDICARE

## 2025-01-06 ENCOUNTER — APPOINTMENT (OUTPATIENT)
Dept: SLEEP MEDICINE | Facility: MEDICAL CENTER | Age: 65
End: 2025-01-06
Attending: STUDENT IN AN ORGANIZED HEALTH CARE EDUCATION/TRAINING PROGRAM
Payer: MEDICARE

## 2025-01-06 DIAGNOSIS — G47.30 SLEEP DISORDER BREATHING: ICD-10-CM

## 2025-01-06 DIAGNOSIS — R06.81 WITNESSED EPISODE OF APNEA: ICD-10-CM

## 2025-01-06 DIAGNOSIS — R06.83 SNORING: ICD-10-CM

## 2025-01-06 DIAGNOSIS — Z91.89 AT RISK FOR SLEEP APNEA: ICD-10-CM

## 2025-01-09 ENCOUNTER — HOSPITAL ENCOUNTER (OUTPATIENT)
Dept: LAB | Facility: MEDICAL CENTER | Age: 65
End: 2025-01-09
Attending: FAMILY MEDICINE
Payer: MEDICARE

## 2025-01-09 DIAGNOSIS — Z11.4 SCREENING FOR HIV WITHOUT PRESENCE OF RISK FACTORS: ICD-10-CM

## 2025-01-09 DIAGNOSIS — Z86.39 H/O HYPERLIPIDEMIA: ICD-10-CM

## 2025-01-09 DIAGNOSIS — R93.3 ABNORMAL FINDINGS ON DIAGNOSTIC IMAGING OF OTHER PARTS OF DIGESTIVE TRACT: ICD-10-CM

## 2025-01-09 DIAGNOSIS — I10 ESSENTIAL HYPERTENSION: ICD-10-CM

## 2025-01-09 DIAGNOSIS — Z78.9 VEGAN DIET: ICD-10-CM

## 2025-01-09 DIAGNOSIS — Z11.59 NEED FOR HEPATITIS C SCREENING TEST: ICD-10-CM

## 2025-01-09 DIAGNOSIS — E55.9 VITAMIN D DEFICIENCY: ICD-10-CM

## 2025-01-09 LAB
25(OH)D3 SERPL-MCNC: 53 NG/ML (ref 30–100)
ALBUMIN SERPL BCP-MCNC: 4.3 G/DL (ref 3.2–4.9)
ALBUMIN/GLOB SERPL: 1.5 G/DL
ALP SERPL-CCNC: 73 U/L (ref 30–99)
ALT SERPL-CCNC: 18 U/L (ref 2–50)
ANION GAP SERPL CALC-SCNC: 11 MMOL/L (ref 7–16)
AST SERPL-CCNC: 20 U/L (ref 12–45)
BASOPHILS # BLD AUTO: 0.8 % (ref 0–1.8)
BASOPHILS # BLD: 0.04 K/UL (ref 0–0.12)
BILIRUB SERPL-MCNC: 0.8 MG/DL (ref 0.1–1.5)
BUN SERPL-MCNC: 5 MG/DL (ref 8–22)
CALCIUM ALBUM COR SERPL-MCNC: 8.9 MG/DL (ref 8.5–10.5)
CALCIUM SERPL-MCNC: 9.1 MG/DL (ref 8.5–10.5)
CHLORIDE SERPL-SCNC: 93 MMOL/L (ref 96–112)
CHOLEST SERPL-MCNC: 197 MG/DL (ref 100–199)
CO2 SERPL-SCNC: 26 MMOL/L (ref 20–33)
CREAT SERPL-MCNC: 0.53 MG/DL (ref 0.5–1.4)
EOSINOPHIL # BLD AUTO: 0.03 K/UL (ref 0–0.51)
EOSINOPHIL NFR BLD: 0.6 % (ref 0–6.9)
ERYTHROCYTE [DISTWIDTH] IN BLOOD BY AUTOMATED COUNT: 46.4 FL (ref 35.9–50)
EST. AVERAGE GLUCOSE BLD GHB EST-MCNC: 100 MG/DL
FASTING STATUS PATIENT QL REPORTED: NORMAL
GFR SERPLBLD CREATININE-BSD FMLA CKD-EPI: 111 ML/MIN/1.73 M 2
GLOBULIN SER CALC-MCNC: 2.9 G/DL (ref 1.9–3.5)
GLUCOSE SERPL-MCNC: 109 MG/DL (ref 65–99)
HBA1C MFR BLD: 5.1 % (ref 4–5.6)
HCT VFR BLD AUTO: 42.5 % (ref 42–52)
HCV AB SER QL: NORMAL
HDLC SERPL-MCNC: 57 MG/DL
HGB BLD-MCNC: 14.9 G/DL (ref 14–18)
HIV 1+2 AB+HIV1 P24 AG SERPL QL IA: NORMAL
IMM GRANULOCYTES # BLD AUTO: 0.08 K/UL (ref 0–0.11)
IMM GRANULOCYTES NFR BLD AUTO: 1.5 % (ref 0–0.9)
LDLC SERPL CALC-MCNC: 120 MG/DL
LYMPHOCYTES # BLD AUTO: 1.47 K/UL (ref 1–4.8)
LYMPHOCYTES NFR BLD: 27.9 % (ref 22–41)
MCH RBC QN AUTO: 33 PG (ref 27–33)
MCHC RBC AUTO-ENTMCNC: 35.1 G/DL (ref 32.3–36.5)
MCV RBC AUTO: 94 FL (ref 81.4–97.8)
MONOCYTES # BLD AUTO: 0.64 K/UL (ref 0–0.85)
MONOCYTES NFR BLD AUTO: 12.2 % (ref 0–13.4)
NEUTROPHILS # BLD AUTO: 3 K/UL (ref 1.82–7.42)
NEUTROPHILS NFR BLD: 57 % (ref 44–72)
NRBC # BLD AUTO: 0 K/UL
NRBC BLD-RTO: 0 /100 WBC (ref 0–0.2)
PLATELET # BLD AUTO: 235 K/UL (ref 164–446)
PMV BLD AUTO: 8.6 FL (ref 9–12.9)
POTASSIUM SERPL-SCNC: 4.1 MMOL/L (ref 3.6–5.5)
PROT SERPL-MCNC: 7.2 G/DL (ref 6–8.2)
RBC # BLD AUTO: 4.52 M/UL (ref 4.7–6.1)
SODIUM SERPL-SCNC: 130 MMOL/L (ref 135–145)
TRIGL SERPL-MCNC: 100 MG/DL (ref 0–149)
VIT B12 SERPL-MCNC: 783 PG/ML (ref 211–911)
WBC # BLD AUTO: 5.3 K/UL (ref 4.8–10.8)

## 2025-01-09 PROCEDURE — 83036 HEMOGLOBIN GLYCOSYLATED A1C: CPT | Mod: GA

## 2025-01-09 PROCEDURE — 86803 HEPATITIS C AB TEST: CPT

## 2025-01-09 PROCEDURE — 80061 LIPID PANEL: CPT

## 2025-01-09 PROCEDURE — 82607 VITAMIN B-12: CPT

## 2025-01-09 PROCEDURE — 80053 COMPREHEN METABOLIC PANEL: CPT

## 2025-01-09 PROCEDURE — 85025 COMPLETE CBC W/AUTO DIFF WBC: CPT

## 2025-01-09 PROCEDURE — 82306 VITAMIN D 25 HYDROXY: CPT

## 2025-01-09 PROCEDURE — 36415 COLL VENOUS BLD VENIPUNCTURE: CPT | Mod: GA

## 2025-01-09 PROCEDURE — G0475 HIV COMBINATION ASSAY: HCPCS | Mod: GA

## 2025-01-13 PROCEDURE — 95800 SLP STDY UNATTENDED: CPT | Performed by: STUDENT IN AN ORGANIZED HEALTH CARE EDUCATION/TRAINING PROGRAM

## 2025-01-13 NOTE — PROCEDURES
DIAGNOSTIC HOME SLEEP TEST (HST) REPORT WatchPAT      PATIENT ID:  NAME:  Misael Mixon  MRN:               [id number]  YOB: 1960  DATE OF STUDY: 01/06/2025      Impression:     This study shows evidence of:      1. Severe obstructive sleep apnea with 4% PAT apnea hypopnea index(pAHI) of 51.4 per hour.  PAT respiratory disturbance index (pRDI) was 57.5 per hour. These findings are based on 7 channels recording of PAT signal with sleep staging, heart rate, pulse oximetry, actigraphy, body position, snoring and respiratory movement.     2. Oxygenation O2 Sat. mean O2 sat was 93%,  giuliana was 82%,  and maximum O2 at 100 %. O2 sat was at or  below 88% for 24.6 min of evaluation time. Oxygen Desaturation (>=4%) Index was 50.7/hr. AVG HR was 67 BPM.      TECHNICAL DESCRIPTION: Patient underwent home sleep apnea testing with peripheral arterial tone signal (WatchPAT™). This is a Type IV portable monitor and device per Medicare. Monitoring was done with 7 channels recording of PAT signal with sleep staging, heart rate, pulse oximetry, actigraphy, body position, snoring and respiratory movement. Prior to using the device, the patient received verbal and written instructions for its application and was provided with the help desk phone number for additional telephonic instruction with 24-hour availability of qualified personnel to answer questions.    Respiratory events:      General sleep summary: . Total recording time is 8 hours and 23 minutes and total Sleep time is 7 hours and 49 minutes. The patient spent 125 minutes in the supine position and 345 minutes in the nonsupine position.      Recommendations:    1. CPAP titration study vs Auto CPAP trial.   2. In general patients with sleep apnea are advised to avoid alcohol and sedatives and to not operate a motor vehicle while drowsy. In some cases alternative treatment options may prove effective in resolving sleep apnea in these options include  upper airway surgery, the use of a dental orthotic or weight loss and positional therapy. Clinical correlation is required.

## 2025-01-16 ENCOUNTER — PATIENT MESSAGE (OUTPATIENT)
Dept: RESEARCH | Facility: MEDICAL CENTER | Age: 65
End: 2025-01-16

## 2025-01-16 ENCOUNTER — OFFICE VISIT (OUTPATIENT)
Dept: MEDICAL GROUP | Facility: MEDICAL CENTER | Age: 65
End: 2025-01-16
Attending: FAMILY MEDICINE
Payer: MEDICARE

## 2025-01-16 ENCOUNTER — RESEARCH ENCOUNTER (OUTPATIENT)
Dept: LAB | Facility: MEDICAL CENTER | Age: 65
End: 2025-01-16

## 2025-01-16 VITALS
BODY MASS INDEX: 25.11 KG/M2 | DIASTOLIC BLOOD PRESSURE: 68 MMHG | OXYGEN SATURATION: 98 % | HEIGHT: 67 IN | HEART RATE: 104 BPM | RESPIRATION RATE: 16 BRPM | TEMPERATURE: 98.3 F | SYSTOLIC BLOOD PRESSURE: 124 MMHG | WEIGHT: 160 LBS

## 2025-01-16 DIAGNOSIS — F10.90 ALCOHOL USE DISORDER: ICD-10-CM

## 2025-01-16 DIAGNOSIS — G47.30 SEVERE SLEEP APNEA: ICD-10-CM

## 2025-01-16 DIAGNOSIS — E78.2 MIXED HYPERLIPIDEMIA: ICD-10-CM

## 2025-01-16 DIAGNOSIS — Z00.00 HEALTHCARE MAINTENANCE: ICD-10-CM

## 2025-01-16 DIAGNOSIS — Z79.899 MEDICATION MANAGEMENT: ICD-10-CM

## 2025-01-16 DIAGNOSIS — E87.1 CHRONIC HYPONATREMIA: ICD-10-CM

## 2025-01-16 DIAGNOSIS — Z13.79 GENETIC TESTING: ICD-10-CM

## 2025-01-16 DIAGNOSIS — Z00.6 RESEARCH STUDY PATIENT: ICD-10-CM

## 2025-01-16 DIAGNOSIS — R73.01 IFG (IMPAIRED FASTING GLUCOSE): ICD-10-CM

## 2025-01-16 PROCEDURE — 99214 OFFICE O/P EST MOD 30 MIN: CPT | Performed by: FAMILY MEDICINE

## 2025-01-16 ASSESSMENT — PATIENT HEALTH QUESTIONNAIRE - PHQ9: CLINICAL INTERPRETATION OF PHQ2 SCORE: 0

## 2025-01-16 ASSESSMENT — FIBROSIS 4 INDEX: FIB4 SCORE: 1.3

## 2025-01-16 NOTE — PROGRESS NOTES
Verbal consent was acquired by the patient to use [employer] ambient listening note generation during this visit.    Subjective   Chief Complaint   Patient presents with    Follow-Up        HPI:   Misael presents today with    History of Present Illness  The patient presents for a follow-up visit.    He has been diagnosed with severe sleep apnea, as confirmed by a recent sleep study. A consultation with a sleep specialist is scheduled for 02/03/2025.    He has made significant lifestyle modifications, including a reduction in alcohol consumption to an average of one beer or wine per day, which is approximately one-third of his previous intake. He is actively working on further reducing his alcohol consumption.    He reports no recent falls since the last consultation and does not perceive himself as a fall risk. However, he acknowledges two falls within the past year under extenuating circumstances, resulting in a knee injury. One incident involved tripping while distracted by his phone.    He is currently on valsartan and hydrochlorothiazide for blood pressure management, along with vitamin B12 and D supplements. He is not taking any antibiotics or pain medications at present.    SOCIAL HISTORY  The patient has cut back on alcohol consumption and currently drinks an average of one beer or wine per day.    MEDICATIONS  Current: Valsartan, hydrochlorothiazide, vitamin B12, vitamin D      Health Maintenance Due   Topic Date Due    Annual Wellness Visit  Never done       Objective   Social History     Tobacco Use    Smoking status: Never    Smokeless tobacco: Never   Vaping Use    Vaping status: Never Used   Substance Use Topics    Alcohol use: Yes     Alcohol/week: 8.4 oz     Types: 7 Glasses of wine, 7 Cans of beer per week     Comment: Daily since 2012; denies DT- Still drinking daily; cut down to a 1 beer/wine    Drug use: No     Comment: Tried Marijuana in past       Exam:  There were no vitals taken for this  visit.    Physical Exam  Constitutional: Alert, no distress  Skin: No rashes in visible areas  Eye: Conjunctiva clear, lids normal  Respiratory: Unlabored respiratory effort, no cough  MSK: Normal gait, moves all extremities  Psych: Alert and oriented x3, normal affect and mood    Allergies   Allergen Reactions    Atovaquone-Proguanil Hcl Hives and Itching    Sulfa Drugs Rash     rash       [facility] - [place], [place] - [address] [place] [address]  [address] [facility] [address]  Phone: [phone] Fax: [phone]    [id number] - [place], [place] - [address]  [address] [place] [address]  Phone: [phone] Fax: [phone]    Current Outpatient Medications   Medication Sig Dispense Refill    valsartan-hydrochlorothiazide (DIOVAN-HCT) 320-25 MG per tablet Take 1 Tablet by mouth every day. 90 Tablet 3    VITAMIN D PO Take  by mouth.      Cyanocobalamin (B-12 PO) Take  by mouth.       No current facility-administered medications for this visit.     Hospital Outpatient Visit on 01/09/2025   Component Date Value Ref Range Status    HIV Ag/Ab Combo Assay 01/09/2025 Non-Reactive  Non Reactive Final    Comment: Roche HIV is a diagnostic test for the qualitative determination of HIV-1  p24 antigen and antibodies to HIV-1 (HIV-1 groups M and O) and HIV-2 in  human serum and plasma. A negative screen does not preclude the possibility  of exposure or infection. Consider retesting in high-risk patients, if  clinically indicated.      Glycohemoglobin 01/09/2025 5.1  4.0 - 5.6 % Final    Comment: Increased risk for diabetes:  5.7 -6.4%  Diabetes:  >6.4%  Glycemic control for adults with diabetes:  <7.0%    The above interpretations are per ADA guidelines.  Diagnosis  of diabetes mellitus on the basis of elevated Hemoglobin A1c  should be confirmed by repeating the Hb A1c test.      Est Avg Glucose 01/09/2025 100  mg/dL Final    Comment: The eAG calculation is based on the A1c-Derived Daily  Glucose  (ADAG) study.  See the ADA's website for additional information.      Cholesterol,Tot 01/09/2025 197  100 - 199 mg/dL Final    Triglycerides 01/09/2025 100  0 - 149 mg/dL Final    HDL 01/09/2025 57  >=40 mg/dL Final    LDL 01/09/2025 120 (H)  <100 mg/dL Final    Hepatitis C Antibody 01/09/2025 Non-Reactive  Non-Reactive Final    Comment: Antibodies to HCV were not detected.  The Roche anti-HCV is a diagnostic test for the qualitative determination of  antibodies to the hepatitis C virus in human serum and plasma. The results  should be used and interpreted only in the context of the overall clinical  picture. A negative test result does not exclude the possibility of exposure  to hepatitis C virus.  Note: Assay performance characteristics have not been established in  populations of immunocompromised or immunosuppressed patients.      Sodium 01/09/2025 130 (L)  135 - 145 mmol/L Final    Potassium 01/09/2025 4.1  3.6 - 5.5 mmol/L Final    Chloride 01/09/2025 93 (L)  96 - 112 mmol/L Final    Co2 01/09/2025 26  20 - 33 mmol/L Final    Anion Gap 01/09/2025 11.0  7.0 - 16.0 Final    Glucose 01/09/2025 109 (H)  65 - 99 mg/dL Final    Bun 01/09/2025 5 (L)  8 - 22 mg/dL Final    Creatinine 01/09/2025 0.53  0.50 - 1.40 mg/dL Final    Calcium 01/09/2025 9.1  8.5 - 10.5 mg/dL Final    Correct Calcium 01/09/2025 8.9  8.5 - 10.5 mg/dL Final    AST(SGOT) 01/09/2025 20  12 - 45 U/L Final    ALT(SGPT) 01/09/2025 18  2 - 50 U/L Final    Alkaline Phosphatase 01/09/2025 73  30 - 99 U/L Final    Total Bilirubin 01/09/2025 0.8  0.1 - 1.5 mg/dL Final    Albumin 01/09/2025 4.3  3.2 - 4.9 g/dL Final    Total Protein 01/09/2025 7.2  6.0 - 8.2 g/dL Final    Globulin 01/09/2025 2.9  1.9 - 3.5 g/dL Final    A-G Ratio 01/09/2025 1.5  g/dL Final    WBC 01/09/2025 5.3  4.8 - 10.8 K/uL Final    RBC 01/09/2025 4.52 (L)  4.70 - 6.10 M/uL Final    Hemoglobin 01/09/2025 14.9  14.0 - 18.0 g/dL Final    Hematocrit 01/09/2025 42.5  42.0 - 52.0 %  Final    MCV 01/09/2025 94.0  81.4 - 97.8 fL Final    MCH 01/09/2025 33.0  27.0 - 33.0 pg Final    MCHC 01/09/2025 35.1  32.3 - 36.5 g/dL Final    RDW 01/09/2025 46.4  35.9 - 50.0 fL Final    Platelet Count 01/09/2025 235  164 - 446 K/uL Final    MPV 01/09/2025 8.6 (L)  9.0 - 12.9 fL Final    Neutrophils-Polys 01/09/2025 57.00  44.00 - 72.00 % Final    Lymphocytes 01/09/2025 27.90  22.00 - 41.00 % Final    Monocytes 01/09/2025 12.20  0.00 - 13.40 % Final    Eosinophils 01/09/2025 0.60  0.00 - 6.90 % Final    Basophils 01/09/2025 0.80  0.00 - 1.80 % Final    Immature Granulocytes 01/09/2025 1.50 (H)  0.00 - 0.90 % Final    Nucleated RBC 01/09/2025 0.00  0.00 - 0.20 /100 WBC Final    Neutrophils (Absolute) 01/09/2025 3.00  1.82 - 7.42 K/uL Final    Includes immature neutrophils, if present.    Lymphs (Absolute) 01/09/2025 1.47  1.00 - 4.80 K/uL Final    Monos (Absolute) 01/09/2025 0.64  0.00 - 0.85 K/uL Final    Eos (Absolute) 01/09/2025 0.03  0.00 - 0.51 K/uL Final    Baso (Absolute) 01/09/2025 0.04  0.00 - 0.12 K/uL Final    Immature Granulocytes (abs) 01/09/2025 0.08  0.00 - 0.11 K/uL Final    NRBC (Absolute) 01/09/2025 0.00  K/uL Final    Vitamin B12 -True Cobalamin 01/09/2025 783  211 - 911 pg/mL Final    25-Hydroxy   Vitamin D 25 01/09/2025 53  30 - 100 ng/mL Final    Comment: Adult Ranges:   <20 ng/mL - Deficiency  20-29 ng/mL - Insufficiency   ng/mL - Sufficiency  Electrochemiluminescence binding assay performed using Roche rosy e  immunoassay analyzer.  The Elecsys Vitamin D total II assay is intended for  the quantitative determination of total 25 hydroxyvitamin D in human serum  and plasma. This assay is to be used as an aid in the assessment of vitamin  D sufficiency in adults.      Fasting Status 01/09/2025 Fasting   Final    GFR (CKD-EPI) 01/09/2025 111  >60 mL/min/1.73 m 2 Final    Comment: Estimated Glomerular Filtration Rate is calculated using  race neutral CKD-EPI 2021 equation per NKF-ASN  recommendations.     The 10-year ASCVD risk score (Henry GALLEGOS, et al., 2019) is: 15.8%    Values used to calculate the score:      Age: 64 years      Sex: Male      Is Non- : No      Diabetic: No      Tobacco smoker: No      Systolic Blood Pressure: 140 mmHg      Is BP treated: Yes      HDL Cholesterol: 57 mg/dL      Total Cholesterol: 197 mg/dL        Assessment & Plan    64 y.o. male with the following -   1. Severe sleep apnea        2. Alcohol use disorder        3. Mixed hyperlipidemia  Lipid Profile    Comp Metabolic Panel      4. IFG (impaired fasting glucose)        5. Genetic testing  Referral to Genetic Research Studies      6. Chronic hyponatremia        7. Medication management        8. Healthcare maintenance            Assessment & Plan  1. Severe sleep apnea - A sleep study confirmed severe sleep apnea.  - Advised to avoid alcohol and sedatives.  - Exercise caution when operating machinery due to potential cognitive impairment.  - Follow-up appointment with a sleep specialist scheduled for 02/03/2025 to interpret results and coordinate necessary supplies, including a CPAP device.    2. Alcohol use - Reduced alcohol consumption to an average of one beer or wine per day.  - Encouraged to continue reducing alcohol consumption due to its impact on cholesterol levels and overall health.    3. Elevated cholesterol - Triglycerides have improved significantly, and HDL levels have increased, but LDL levels remain about 20 points higher than normal.  - ASCVD score is 15.8%, indicating a moderate risk for a major heart attack or stroke within the next 10 years.  - Advised to continue reducing alcohol consumption and adopt a whole-food, plant-based diet.  - Cholesterol levels to be rechecked in 6 months.  - If LDL levels remain elevated, starting a cholesterol-lowering medication will be reconsidered.    4. Impaired fasting glucose - A1c is normal at 5.1, but glucose levels suggest impaired  fasting glucose.  - Advised to continue healthy lifestyle changes, including diet and exercise, to improve glucose levels.    5. Low sodium levels - Sodium levels have historically been low, likely due to alcohol consumption and possibly medication use.  - Recent labs show improvement.  - Continued reduction in alcohol intake is expected to further improve sodium levels.    6. Medication management - Currently on blood pressure medication (valsartan and hydrochlorothiazide) and receives home delivery of prescriptions.  - Advised to schedule a follow-up appointment before the next refill is due.  - If unable to schedule, should send a message to request a refill.    7. Health maintenance - Routine screening for HIV was negative. Hepatitis C screening was also negative.  - No further screening needed unless there is a concern about exposure.  - Genetic testing through the [employer] project has been ordered to better understand the risk for different types of cancers and heart disease.    Follow-up  - The patient will follow up in 6 months.      Return in about 6 months (around 7/16/2025) for chronic condition follow up.    Please be advised that this document was generated using voice recognition software. While I have made reasonable efforts to correct any obvious errors, there may still be grammatical or content inaccuracies that were not identified or corrected prior to finalization.

## 2025-01-27 ENCOUNTER — HOSPITAL ENCOUNTER (OUTPATIENT)
Dept: LAB | Facility: MEDICAL CENTER | Age: 65
End: 2025-01-27
Attending: FAMILY MEDICINE

## 2025-01-27 DIAGNOSIS — Z00.6 RESEARCH STUDY PATIENT: ICD-10-CM

## 2025-01-29 ENCOUNTER — RESEARCH ENCOUNTER (OUTPATIENT)
Dept: LAB | Facility: MEDICAL CENTER | Age: 65
End: 2025-01-29
Payer: MEDICARE

## 2025-02-03 ENCOUNTER — OFFICE VISIT (OUTPATIENT)
Dept: SLEEP MEDICINE | Facility: MEDICAL CENTER | Age: 65
End: 2025-02-03
Attending: PHYSICIAN ASSISTANT
Payer: MEDICARE

## 2025-02-03 VITALS
RESPIRATION RATE: 16 BRPM | DIASTOLIC BLOOD PRESSURE: 74 MMHG | HEIGHT: 67 IN | OXYGEN SATURATION: 97 % | WEIGHT: 160 LBS | SYSTOLIC BLOOD PRESSURE: 122 MMHG | BODY MASS INDEX: 25.11 KG/M2 | HEART RATE: 76 BPM

## 2025-02-03 DIAGNOSIS — G47.33 OSA (OBSTRUCTIVE SLEEP APNEA): ICD-10-CM

## 2025-02-03 PROCEDURE — 99213 OFFICE O/P EST LOW 20 MIN: CPT | Performed by: PHYSICIAN ASSISTANT

## 2025-02-03 PROCEDURE — 3078F DIAST BP <80 MM HG: CPT | Performed by: PHYSICIAN ASSISTANT

## 2025-02-03 PROCEDURE — 99212 OFFICE O/P EST SF 10 MIN: CPT | Performed by: PHYSICIAN ASSISTANT

## 2025-02-03 PROCEDURE — 3074F SYST BP LT 130 MM HG: CPT | Performed by: PHYSICIAN ASSISTANT

## 2025-02-03 ASSESSMENT — ENCOUNTER SYMPTOMS
COUGH: 0
SORE THROAT: 0
WEIGHT LOSS: 0
FEVER: 0
HEARTBURN: 0
INSOMNIA: 0
PALPITATIONS: 0
DIZZINESS: 0
SINUS PAIN: 0
HEADACHES: 0
SHORTNESS OF BREATH: 0
CHILLS: 0
TREMORS: 0
WHEEZING: 0
SPUTUM PRODUCTION: 0
ORTHOPNEA: 0

## 2025-02-03 ASSESSMENT — FIBROSIS 4 INDEX: FIB4 SCORE: 1.3

## 2025-02-03 NOTE — PATIENT INSTRUCTIONS
1-reviewed sleep study results  2-findings consistent with severe sleep apnea  3-auto cpap trial recommended  4-strategies for success   Minimum insurance usage requirement in the first 90 days of having device of 4 hours per night most nights   Therapeutic goal is 6-6.5 hours per night every night  Training period for 2-3 days use device for 30 min to 2 hours while awake   Transition to all night use   5-reviewed risks associated with untreated sleep apnea   6-send order to Cpap and more  7-contact info provided  8-short term follow up in 3 months with 30 days of data

## 2025-02-03 NOTE — PROGRESS NOTES
"Chief Complaint   Patient presents with    Apnea     Last Office Visit 12/13/24 with      Study Complete on 1/6/25    Overnight Home Sleep Study       HPI:  Misael Mixon is a 64 y.o. year old male here today for follow-up on sleep study results.  Last seen in clinic 12/13/2024 by Dr. Kavon Richmond.      Past Medical History: Essential hypertension, vitamin D deficiency, fall risk, diverticulitis, snoring and daytime sleepiness.    Vitals:  /74 (BP Location: Left arm, Patient Position: Sitting, BP Cuff Size: Adult)   Pulse 76   Resp 16   Ht 1.702 m (5' 7\")   Wt 72.6 kg (160 lb)   SpO2 97% BMI of 25.06 kg/m².    Recent Imaging: None    Overnight home sleep study obtained 1/6/2025 demonstrating findings consistent with severe obstructive sleep apnea with AHI of 51.4 events per hour P RDI of 57.5 events per hour.  Low O2 sat of 82% with sats less than or equal to 88 for 24.6 minutes of evaluated time.  Recommendation for CPAP titration versus auto CPAP trial.    Sleep schedule goes to bed 10-10:30 PM, wakens 6:30 AM , and gets up during the night bathroom x 1   Symptoms denies day time somnolence and denies morning headache    [place] Sleepiness Scale reported as 3/24 on 12/13/2024  Stop Bang Score 6 (12/9/2024  9:12 AM)         Review of Systems   Constitutional:  Positive for malaise/fatigue (moderate). Negative for chills, fever and weight loss.   HENT:  Positive for tinnitus (rare whistle). Negative for congestion, hearing loss, nosebleeds, sinus pain and sore throat.    Eyes:         Presc glasses/contacts    Respiratory:  Negative for cough, sputum production, shortness of breath and wheezing.    Cardiovascular:  Negative for chest pain, palpitations, orthopnea and leg swelling.   Gastrointestinal:  Negative for heartburn (managed by diet).        No dentures, missing one molar pending implant, esophageal stricture has had dilated x 2    Neurological:  Negative for dizziness, tremors " and headaches.   Psychiatric/Behavioral:  The patient does not have insomnia.        Past Medical History:   Diagnosis Date    Apnea, sleep     Daytime sleepiness     Gasping for breath     Hyperlipidemia     Hypertension     Snoring        Past Surgical History:   Procedure Laterality Date    PB KNEE SCOPE,MED/LAT MENISECTOMY Right 9/7/2022    Procedure: RIGHT KNEE ARTHROSCOPY PARTIAL MEDIAL MENISCECTOMY, REPAIRS AS INDICATED;  Surgeon: Zoe Naidu M.D.;  Location: [facility];  Service: Orthopedics    IRRIGATION & DEBRIDEMENT ORTHO  7/13/2011    Performed by KAY BYNUM at [facility]    OTHER      tonsills    TONSILLECTOMY         Family History   Problem Relation Age of Onset    Psychiatric Illness Mother         MDD    Stroke Mother         TIA    Cancer Father     Hypertension Father     Stroke Father     Lung Disease Father     Cancer Brother         Lymphoma    Psychiatric Illness Son         MDD       Social History     Socioeconomic History    Marital status:      Spouse name: Not on file    Number of children: 2    Years of education: Not on file    Highest education level: Some college, no degree   Occupational History     Comment:    Tobacco Use    Smoking status: Never    Smokeless tobacco: Never   Vaping Use    Vaping status: Never Used   Substance and Sexual Activity    Alcohol use: Yes     Alcohol/week: 8.4 oz     Types: 7 Glasses of wine, 7 Cans of beer per week     Comment: Daily since 2012; denies DT- Still drinking daily; cut down to a 1 beer/wine    Drug use: No     Comment: Tried Marijuana in past    Sexual activity: Yes     Partners: Female     Birth control/protection: None   Other Topics Concern    Not on file   Social History Narrative    Lives with wife Domi and their 2 cats     Social Drivers of Health     Financial Resource Strain: Low Risk  (12/2/2024)    Overall Financial Resource Strain (CARDIA)     Difficulty of Paying  Living Expenses: Not hard at all   Food Insecurity: No Food Insecurity (12/2/2024)    Hunger Vital Sign     Worried About Running Out of Food in the Last Year: Never true     Ran Out of Food in the Last Year: Never true   Transportation Needs: No Transportation Needs (12/2/2024)    PRAPARE - Transportation     Lack of Transportation (Medical): No     Lack of Transportation (Non-Medical): No   Physical Activity: Sufficiently Active (12/2/2024)    Exercise Vital Sign     Days of Exercise per Week: 5 days     Minutes of Exercise per Session: 30 min   Stress: No Stress Concern Present (12/2/2024)    [employer] [place] [employer] - Occupational Stress Questionnaire     Feeling of Stress : Not at all   Social Connections: Moderately Integrated (12/2/2024)    Social Connection and Isolation Panel [NHANES]     Frequency of Communication with Friends and Family: More than three times a week     Frequency of Social Gatherings with Friends and Family: More than three times a week     Attends Cheondo[employer] Services: Never     Active Member of Clubs or Organizations: Yes     Attends Club or Organization Meetings: More than 4 times per year     Marital Status:    Intimate Partner Violence: Not on file   Housing Stability: Low Risk  (12/2/2024)    Housing Stability Vital Sign     Unable to Pay for Housing in the Last Year: No     Number of Times Moved in the Last Year: 0     Homeless in the Last Year: No       Allergies as of 02/03/2025 - Reviewed 02/03/2025   Allergen Reaction Noted    Atovaquone-proguanil hcl Hives and Itching 09/15/2023    Sulfa drugs Rash 09/01/2011          Current medications as of today   Current Outpatient Medications   Medication Sig Dispense Refill    valsartan-hydrochlorothiazide (DIOVAN-HCT) 320-25 MG per tablet Take 1 Tablet by mouth every day. 90 Tablet 3    VITAMIN D PO Take  by mouth.      Cyanocobalamin (B-12 PO) Take  by mouth.       No current facility-administered medications for  this visit.         Physical Exam:   Gen:           Alert and oriented, No apparent distress. Mood and affect appropriate, normal interaction with examiner.   Hearing:     Grossly intact.  Nose:          Normal, no lesions or deformities.  Dentition:    fair dentition.   Oropharynx:   Tongue normal, posterior pharynx without erythema or exudate.  Mallampati Classification: IV  Neck:        Supple, trachea midline, no masses.  Respiratory Effort: No intercostal retractions or use of accessory muscles.   Gait and Station: Normal.  Digits and Nails: No clubbing, cyanosis, petechiae, or nodes.   Skin:        No rashes, lesions or ulcers noted.               Ext:           No cyanosis or edema.      Immunizations:  Flu: 9/16/2024  PCV 20: 9/16/2024  Pfizer booster SARS-CoV-2 vaccine: 9/14/2022  Moderna SARS CoV2 Vaccine: 6/6/2022, 12/1/2021, 4/17/2021, 3/19/2021  COVID-19 mRNA vaccine: 9/16/2024, 9/28/2023    Assessment / Plan:  1. CRESENCIO (obstructive sleep apnea)    Reviewed sleep study results, findings consistent with severe sleep apnea.  Reviewed risks associated with untreated sleep apnea.  Auto CPAP trial was recommended.  Send orders for auto CPAP to [facility].  Contact info provided.  Reviewed strategies for success including minimum usage requirements for insurance purposes in the first 90 days of having new device and therapeutic goals.  Patient will need short-term follow-up to review compliance with at least 30 days of data to review.  Instructed to bring entire device to clinic for first follow-up visit, patient states understanding.    Follow-up:   Return in about 3 months (around 5/3/2025) for Return with Katarina Oropeza PA-C.    Please note that this dictation was created using voice recognition software. I have made every reasonable attempt to correct obvious errors, but it is possible there are errors of grammar and possibly content that I did not discover before finalizing the note.

## 2025-02-05 LAB
APOB+LDLR+PCSK9 GENE MUT ANL BLD/T: NOT DETECTED
BRCA1+BRCA2 DEL+DUP + FULL MUT ANL BLD/T: NOT DETECTED
MLH1+MSH2+MSH6+PMS2 GN DEL+DUP+FUL M: NOT DETECTED

## 2025-03-18 ENCOUNTER — OFFICE VISIT (OUTPATIENT)
Dept: URGENT CARE | Facility: CLINIC | Age: 65
End: 2025-03-18
Payer: MEDICARE

## 2025-03-18 VITALS
HEART RATE: 91 BPM | DIASTOLIC BLOOD PRESSURE: 82 MMHG | BODY MASS INDEX: 26.84 KG/M2 | WEIGHT: 167 LBS | RESPIRATION RATE: 18 BRPM | HEIGHT: 66 IN | SYSTOLIC BLOOD PRESSURE: 126 MMHG | OXYGEN SATURATION: 96 % | TEMPERATURE: 97.2 F

## 2025-03-18 DIAGNOSIS — K57.92 DIVERTICULITIS: ICD-10-CM

## 2025-03-18 DIAGNOSIS — R10.32 LLQ PAIN: ICD-10-CM

## 2025-03-18 LAB
APPEARANCE UR: ABNORMAL
BILIRUB UR STRIP-MCNC: NEGATIVE MG/DL
COLOR UR AUTO: YELLOW
GLUCOSE UR STRIP.AUTO-MCNC: NEGATIVE MG/DL
KETONES UR STRIP.AUTO-MCNC: NEGATIVE MG/DL
LEUKOCYTE ESTERASE UR QL STRIP.AUTO: NEGATIVE
NITRITE UR QL STRIP.AUTO: NEGATIVE
PH UR STRIP.AUTO: 8.5 [PH] (ref 5–8)
PROT UR QL STRIP: NEGATIVE MG/DL
RBC UR QL AUTO: NEGATIVE
SP GR UR STRIP.AUTO: 1.01
UROBILINOGEN UR STRIP-MCNC: ABNORMAL MG/DL

## 2025-03-18 PROCEDURE — 99213 OFFICE O/P EST LOW 20 MIN: CPT

## 2025-03-18 PROCEDURE — 3079F DIAST BP 80-89 MM HG: CPT

## 2025-03-18 PROCEDURE — 3074F SYST BP LT 130 MM HG: CPT

## 2025-03-18 PROCEDURE — 81002 URINALYSIS NONAUTO W/O SCOPE: CPT

## 2025-03-18 ASSESSMENT — ENCOUNTER SYMPTOMS
NAUSEA: 0
COUGH: 0
BLOOD IN STOOL: 0
MYALGIAS: 0
WEIGHT LOSS: 0
FEVER: 0
ABDOMINAL PAIN: 1
DIARRHEA: 0
CONSTIPATION: 0
VOMITING: 0
SHORTNESS OF BREATH: 0

## 2025-03-18 ASSESSMENT — FIBROSIS 4 INDEX: FIB4 SCORE: 1.3

## 2025-03-18 NOTE — PROGRESS NOTES
Chief Complaint   Patient presents with    Other     X 3 days/ diverticulitis/ left abdominal pain        HISTORY OF PRESENT ILLNESS: Patient is a pleasant 64 y.o. male who presents to urgent care today left lower quadrant pain for the last 3 days.  Patient states he has been eating a poor diet as he has been traveling for the last several days.  He notes a history of diverticulitis, he believes this is the cause.  He denies any fevers, no nausea or vomiting, no major blood in his stool.  He does note some slight pain with urination.    Patient Active Problem List    Diagnosis Date Noted    Risk for falls 12/09/2024    Diverticulitis 09/21/2023    Fatty liver 09/21/2023    Hyponatremia 09/21/2023    Acute medial meniscal tear, right, initial encounter 08/30/2022    Preventative health care 05/18/2022    Lump in neck 03/14/2022    Essential hypertension 12/21/2021    Mixed hyperlipidemia 12/21/2021    Impaired fasting glucose 12/21/2021    Pure hypercholesterolemia 12/21/2021    Vitamin D deficiency 12/21/2021    Hyperlipidemia, unspecified 02/05/2020    Actinic keratosis 04/27/2017       Allergies:Atovaquone-proguanil hcl and Sulfa drugs    Current Outpatient Medications Ordered in Epic   Medication Sig Dispense Refill    valsartan-hydrochlorothiazide (DIOVAN-HCT) 320-25 MG per tablet Take 1 Tablet by mouth every day. 90 Tablet 3    VITAMIN D PO Take  by mouth.      Cyanocobalamin (B-12 PO) Take  by mouth.       No current Epic-ordered facility-administered medications on file.       Past Medical History:   Diagnosis Date    Apnea, sleep     Daytime sleepiness     Gasping for breath     Hyperlipidemia     Hypertension     Snoring        Social History     Tobacco Use    Smoking status: Never    Smokeless tobacco: Never   Vaping Use    Vaping status: Never Used   Substance Use Topics    Alcohol use: Yes     Alcohol/week: 8.4 oz     Types: 7 Glasses of wine, 7 Cans of beer per week     Comment: Daily since 2012;  "denies DT- Still drinking daily; cut down to a 1 beer/wine    Drug use: No     Comment: Tried Marijuana in past       Family Status   Relation Name Status    Mo Mother     Fa Father     Bro Brother Alive    Son      Son  Alive   No partnership data on file     Family History   Problem Relation Age of Onset    Psychiatric Illness Mother         MDD    Stroke Mother         TIA    Cancer Father     Hypertension Father     Stroke Father     Lung Disease Father     Cancer Brother         Lymphoma    Psychiatric Illness Son         MDD       Review of Systems   Constitutional:  Negative for fever and weight loss.   Respiratory:  Negative for cough and shortness of breath.    Cardiovascular:  Negative for chest pain.   Gastrointestinal:  Positive for abdominal pain. Negative for blood in stool, constipation, diarrhea, nausea and vomiting.   Genitourinary:  Positive for dysuria. Negative for frequency and urgency.   Musculoskeletal:  Negative for myalgias.       Exam:  /82   Pulse 91   Temp 36.2 °C (97.2 °F) (Temporal)   Resp 18   Ht 1.676 m (5' 6\")   Wt 75.8 kg (167 lb)   SpO2 96%   Physical Exam  Vitals reviewed.   Constitutional:       Appearance: Normal appearance.   HENT:      Head: Normocephalic.      Right Ear: Tympanic membrane and ear canal normal. There is no impacted cerumen. Tympanic membrane is not injected or erythematous.      Left Ear: Tympanic membrane and ear canal normal. There is no impacted cerumen. Tympanic membrane is not injected or erythematous.      Mouth/Throat:      Mouth: Mucous membranes are moist.      Pharynx: Oropharynx is clear. No oropharyngeal exudate.      Tonsils: No tonsillar exudate. 0 on the right. 0 on the left.   Eyes:      General:         Right eye: No discharge.         Left eye: No discharge.      Extraocular Movements: Extraocular movements intact.      Conjunctiva/sclera: Conjunctivae normal.      Pupils: Pupils are equal, round, and " reactive to light.   Cardiovascular:      Rate and Rhythm: Normal rate and regular rhythm.      Pulses: Normal pulses.      Heart sounds: Normal heart sounds. No murmur heard.  Pulmonary:      Effort: Pulmonary effort is normal. No respiratory distress.      Breath sounds: Normal breath sounds. No stridor. No wheezing.   Abdominal:      General: Abdomen is flat. Bowel sounds are normal. There is no distension.      Palpations: Abdomen is soft.      Tenderness: There is abdominal tenderness. There is no right CVA tenderness, left CVA tenderness, guarding or rebound.      Comments: Left lower quadrant   Musculoskeletal:         General: Normal range of motion.      Cervical back: Normal range of motion.   Lymphadenopathy:      Cervical: No cervical adenopathy.   Skin:     General: Skin is warm and dry.      Capillary Refill: Capillary refill takes less than 2 seconds.      Findings: No bruising or rash.   Neurological:      General: No focal deficit present.      Mental Status: He is alert.      Sensory: No sensory deficit.      Motor: No weakness.   Psychiatric:         Mood and Affect: Mood normal.         Behavior: Behavior normal.         Thought Content: Thought content normal.         Judgment: Judgment normal.             Assessment/Plan:  1. LLQ pain  - POCT Urinalysis    Based on physical exam along with review of systems I do think this is likely diverticulitis.  Patient is requesting antibiotics.  As he has had no major fevers, no nausea or vomiting, no blood in his stool and is otherwise asymptomatic I do think he is better off to go ahead and attempt a light diet for the next couple days, encouraged more fluids than food.  Should patient's symptoms persist or he gets worse he was advised to call the office for considerations of contingency antibiotics and imaging at that time.  POCT urinalysis complete to rule out potential causes.  This is negative for anything significant.  Patient provided with  education.  Vies to follow-up if he continues to get worse or does not improve.    Supportive care, differential diagnoses, and indications for immediate follow-up discussed with patient.   Pathogenesis of diagnosis discussed including typical length and natural progression.   Instructed to return to clinic or nearest emergency department for any change in condition, further concerns, or worsening of symptoms.  Patient states understanding of the plan of care and discharge instructions.  Instructed to make an appointment, for follow up, with primary care provider.    Please note that this dictation was created using voice recognition software. I have made every reasonable attempt to correct obvious errors, but I expect that there are errors of grammar and possibly content that I did not discover before finalizing the note.      Briseyda FISCHER

## 2025-03-18 NOTE — PATIENT INSTRUCTIONS
For most patients who are appropriate for outpatient management, we suggest against antibiotic treatment.     Additional supportive care includes:  ?Pain control with oral analgesics (eg, acetaminophen, ibuprofen, or oxycodone)  ?Liquid diet, advanced based on clinical response (typically in two to three days)  ?Clinical reassessment within one week and weekly thereafter until complete symptom resolution

## 2025-05-05 ENCOUNTER — OFFICE VISIT (OUTPATIENT)
Dept: SLEEP MEDICINE | Facility: MEDICAL CENTER | Age: 65
End: 2025-05-05
Attending: PHYSICIAN ASSISTANT
Payer: MEDICARE

## 2025-05-05 VITALS
HEIGHT: 66 IN | WEIGHT: 162 LBS | DIASTOLIC BLOOD PRESSURE: 92 MMHG | SYSTOLIC BLOOD PRESSURE: 136 MMHG | HEART RATE: 95 BPM | RESPIRATION RATE: 14 BRPM | BODY MASS INDEX: 26.03 KG/M2 | OXYGEN SATURATION: 96 %

## 2025-05-05 DIAGNOSIS — G47.33 OSA (OBSTRUCTIVE SLEEP APNEA): ICD-10-CM

## 2025-05-05 PROCEDURE — 3075F SYST BP GE 130 - 139MM HG: CPT | Performed by: PHYSICIAN ASSISTANT

## 2025-05-05 PROCEDURE — 99213 OFFICE O/P EST LOW 20 MIN: CPT | Performed by: PHYSICIAN ASSISTANT

## 2025-05-05 PROCEDURE — 3080F DIAST BP >= 90 MM HG: CPT | Performed by: PHYSICIAN ASSISTANT

## 2025-05-05 PROCEDURE — 99214 OFFICE O/P EST MOD 30 MIN: CPT | Performed by: PHYSICIAN ASSISTANT

## 2025-05-05 ASSESSMENT — ENCOUNTER SYMPTOMS
WHEEZING: 0
PALPITATIONS: 0
CHILLS: 0
HEADACHES: 0
SINUS PAIN: 0
FEVER: 0
ORTHOPNEA: 0
WEIGHT LOSS: 0
SORE THROAT: 0
SHORTNESS OF BREATH: 0
INSOMNIA: 1
COUGH: 0
DIZZINESS: 0
HEARTBURN: 0
TREMORS: 0
SPUTUM PRODUCTION: 0

## 2025-05-05 ASSESSMENT — FIBROSIS 4 INDEX: FIB4 SCORE: 1.3

## 2025-05-05 NOTE — PATIENT INSTRUCTIONS
1-reviewed first compliance demonstrating use and benefit  2-met all first compliance requirements  3-updated order for mask and supplies to cpap and more   4-As a reminder use distilled water only in humidifier chamber.  Fresh fill daily  5-Today we reviewed equipment cleaning  once weekly minimum  mask, tubing and water chamber  use dedicated container  use mild soap and water  SoClean or other ozone  are not recommended  white vinegar and water solution is no longer recommended  hang tubing to dry  mask sanitizing wipes are an option for use   6-Equipment replacement schedule : Nasal pillows 2 times per month, Head gear every 6 months, Tubing every 3 months, Ultra-fine filters 2 times per month, Humidifier chamber every 6 months  7-follow up in one year sooner if needed

## 2025-05-05 NOTE — PROGRESS NOTES
"Chief Complaint   Patient presents with    Apnea     Last Office Visit 2/3/25 with Katarina Oropeza P.A.-C.    1st Compliance    PAP/O2/OAT: Auto CPAP cmH20 5-15    Set up: 2/21/25       HPI:  Misael Mixon is a 64 y.o. year old male here today for follow-up on obstructive sleep apnea and first compliance.  Last seen in clinic 2/3/2025 by MOLLY Louie.  Previously evaluated by Dr. Kavon Richmond on 12/13/2024.      Past Medical History: CRESENCIO, essential hypertension, vitamin D deficiency, fall risk, diverticulitis.    Vitals:  BP (!) 136/92 (BP Location: Left arm, Patient Position: Sitting, BP Cuff Size: Adult)   Pulse 95   Resp 14   Ht 1.676 m (5' 6\")   Wt 73.5 kg (162 lb)   SpO2 96% BMI of 26.15 kg/m².    Recent Imaging: None    Currently using  Resmed auto CPAP @5-15 cm H20 pressure; compliance reviewed for 4/5/2025 through 5/4/2025, days used 30/30, average daily usage 6 hours 30 minutes, 97% of days greater than or equal to 4 hours, mask leak at 1.7 LPM at 95th percentile, AHI 2.2 per hour.  See media for full report.    Device obtained 2/21/2025  DME provider [facility]   Mask interface nasal pillows    Overnight home sleep study obtained 1/6/2025 demonstrating findings consistent with severe obstructive sleep apnea with AHI of 51.4 events per hour P RDI of 57.5 events per hour. Low O2 sat of 82% with sats less than or equal to 88 for 24.6 minutes of evaluated time. Recommendation for CPAP titration versus auto CPAP trial.    Sleep schedule goes to bed 10-11 p.m., wakens 6-7 a.m. , and gets up during the night bathroom x 1   Symptoms denies day time somnolence and denies morning headache    [place] Sleepiness Scale reported as 3/24 on 12/13/2024  Stop Bang Score 6 (12/9/2024  9:12 AM)         Review of Systems   Constitutional:  Negative for chills, fever, malaise/fatigue and weight loss.   HENT:  Negative for congestion, hearing loss, nosebleeds, sinus pain, sore throat and tinnitus.  "   Eyes:         Presc glasses    Respiratory:  Negative for cough, sputum production, shortness of breath and wheezing.    Cardiovascular:  Negative for chest pain, palpitations, orthopnea and leg swelling.   Gastrointestinal:  Negative for heartburn.        No dentures, one missing tooth pending implant, benign stricture food gets stuck occasionally    Neurological:  Negative for dizziness, tremors and headaches.   Psychiatric/Behavioral:  The patient has insomnia (occasional difficulty falling back to sleep).        Past Medical History:   Diagnosis Date    Apnea, sleep     Daytime sleepiness     Gasping for breath     Hyperlipidemia     Hypertension     Snoring        Past Surgical History:   Procedure Laterality Date    PB KNEE SCOPE,MED/LAT MENISECTOMY Right 9/7/2022    Procedure: RIGHT KNEE ARTHROSCOPY PARTIAL MEDIAL MENISCECTOMY, REPAIRS AS INDICATED;  Surgeon: Zoe Naidu M.D.;  Location: [facility];  Service: Orthopedics    IRRIGATION & DEBRIDEMENT ORTHO  7/13/2011    Performed by KAY BYNUM at [facility]    OTHER      tonsills    TONSILLECTOMY         Family History   Problem Relation Age of Onset    Psychiatric Illness Mother         MDD    Stroke Mother         TIA    Cancer Father     Hypertension Father     Stroke Father     Lung Disease Father     Cancer Brother         Lymphoma    Psychiatric Illness Son         MDD       Social History     Socioeconomic History    Marital status:      Spouse name: Not on file    Number of children: 2    Years of education: Not on file    Highest education level: Some college, no degree   Occupational History     Comment:    Tobacco Use    Smoking status: Never    Smokeless tobacco: Never   Vaping Use    Vaping status: Never Used   Substance and Sexual Activity    Alcohol use: Yes     Alcohol/week: 8.4 oz     Types: 7 Glasses of wine, 7 Cans of beer per week     Comment: Daily since 2012; denies DT-  Still drinking daily; cut down to a 1 beer/wine    Drug use: No     Comment: Tried Marijuana in past    Sexual activity: Yes     Partners: Female     Birth control/protection: None   Other Topics Concern    Not on file   Social History Narrative    Lives with wife Domi and their 2 cats     Social Drivers of Health     Financial Resource Strain: Low Risk  (12/2/2024)    Overall Financial Resource Strain (CARDIA)     Difficulty of Paying Living Expenses: Not hard at all   Food Insecurity: No Food Insecurity (12/2/2024)    Hunger Vital Sign     Worried About Running Out of Food in the Last Year: Never true     Ran Out of Food in the Last Year: Never true   Transportation Needs: No Transportation Needs (12/2/2024)    PRAPARE - Transportation     Lack of Transportation (Medical): No     Lack of Transportation (Non-Medical): No   Physical Activity: Sufficiently Active (12/2/2024)    Exercise Vital Sign     Days of Exercise per Week: 5 days     Minutes of Exercise per Session: 30 min   Stress: No Stress Concern Present (12/2/2024)    [employer] - Occupational Stress Questionnaire     Feeling of Stress : Not at all   Social Connections: Moderately Integrated (12/2/2024)    Social Connection and Isolation Panel [NHANES]     Frequency of Communication with Friends and Family: More than three times a week     Frequency of Social Gatherings with Friends and Family: More than three times a week     Attends Voodoo Services: Never     Active Member of Clubs or Organizations: Yes     Attends Club or Organization Meetings: More than 4 times per year     Marital Status:    Intimate Partner Violence: Not on file   Housing Stability: Low Risk  (12/2/2024)    Housing Stability Vital Sign     Unable to Pay for Housing in the Last Year: No     Number of Times Moved in the Last Year: 0     Homeless in the Last Year: No       Allergies as of 05/05/2025 - Reviewed 05/05/2025   Allergen Reaction Noted     Atovaquone-proguanil hcl Hives and Itching 09/15/2023    Sulfa drugs Rash 09/01/2011          Current medications as of today   Current Outpatient Medications   Medication Sig Dispense Refill    valsartan-hydrochlorothiazide (DIOVAN-HCT) 320-25 MG per tablet Take 1 Tablet by mouth every day. 90 Tablet 3    VITAMIN D PO Take  by mouth.      Cyanocobalamin (B-12 PO) Take  by mouth.       No current facility-administered medications for this visit.         Physical Exam:   Gen:           Alert and oriented, No apparent distress. Mood and affect appropriate, normal interaction with examiner.   Hearing:     Grossly intact.  Nose:          Normal, no lesions or deformities.  Dentition:    Fair dentition.   Oropharynx:   Tongue normal, posterior pharynx without erythema or exudate.  Mallampati Classification: IV  Neck:        Supple, trachea midline, no masses.  Respiratory Effort: No intercostal retractions or use of accessory muscles.   Gait and Station: Normal.  Digits and Nails: No clubbing, cyanosis, petechiae, or nodes.   Skin:        No rashes, lesions or ulcers noted.               Ext:           No cyanosis or edema.      Immunizations:  Flu: 9/16/2024  PCV 20: 9/16/2024  Pfizer booster SARS-CoV-2 vaccine: 9/14/2022   Moderna SARS CoV2 Vaccine: 6/6/2022, 12/1/2021, 4/17/2021, 3/19/2021  COVID-19 mRNA vaccine: 9/16/2024, 9/28/2023      Assessment / Plan:  1. CRESENCIO (obstructive sleep apnea)  - DME Mask and Supplies    Reviewed first compliance, demonstrating use and benefit, met all first compliance requirements.  Updated order for mask and supplies to CPAP and more.  Patient was reminded to use distilled water only in humidifier chamber with fresh fill daily.  Reviewed equipment cleaning as well as equipment replacement schedule.  Patient to follow-up in 1 year, sooner if needed.      Follow-up:   Return in about 1 year (around 5/5/2026) for Return with Katarina Oropeza PA-C.    Please note that this dictation was  created using voice recognition software. I have made every reasonable attempt to correct obvious errors, but it is possible there are errors of grammar and possibly content that I did not discover before finalizing the note.

## 2025-07-08 ENCOUNTER — PATIENT MESSAGE (OUTPATIENT)
Dept: SLEEP MEDICINE | Facility: MEDICAL CENTER | Age: 65
End: 2025-07-08
Payer: MEDICARE

## 2025-07-08 DIAGNOSIS — G47.33 OSA (OBSTRUCTIVE SLEEP APNEA): Primary | ICD-10-CM

## 2025-07-09 ENCOUNTER — HOSPITAL ENCOUNTER (OUTPATIENT)
Dept: LAB | Facility: MEDICAL CENTER | Age: 65
End: 2025-07-09
Attending: FAMILY MEDICINE
Payer: MEDICARE

## 2025-07-09 DIAGNOSIS — E78.2 MIXED HYPERLIPIDEMIA: ICD-10-CM

## 2025-07-09 LAB
ALBUMIN SERPL BCP-MCNC: 4.3 G/DL (ref 3.2–4.9)
ALBUMIN/GLOB SERPL: 1.5 G/DL
ALP SERPL-CCNC: 81 U/L (ref 30–99)
ALT SERPL-CCNC: 19 U/L (ref 2–50)
ANION GAP SERPL CALC-SCNC: 12 MMOL/L (ref 7–16)
AST SERPL-CCNC: 25 U/L (ref 12–45)
BILIRUB SERPL-MCNC: 0.9 MG/DL (ref 0.1–1.5)
BUN SERPL-MCNC: 4 MG/DL (ref 8–22)
CALCIUM ALBUM COR SERPL-MCNC: 8.9 MG/DL (ref 8.5–10.5)
CALCIUM SERPL-MCNC: 9.1 MG/DL (ref 8.5–10.5)
CHLORIDE SERPL-SCNC: 91 MMOL/L (ref 96–112)
CHOLEST SERPL-MCNC: 170 MG/DL (ref 100–199)
CO2 SERPL-SCNC: 25 MMOL/L (ref 20–33)
CREAT SERPL-MCNC: 0.64 MG/DL (ref 0.5–1.4)
FASTING STATUS PATIENT QL REPORTED: NORMAL
GFR SERPLBLD CREATININE-BSD FMLA CKD-EPI: 105 ML/MIN/1.73 M 2
GLOBULIN SER CALC-MCNC: 2.9 G/DL (ref 1.9–3.5)
GLUCOSE SERPL-MCNC: 107 MG/DL (ref 65–99)
HDLC SERPL-MCNC: 49 MG/DL
LDLC SERPL CALC-MCNC: 97 MG/DL
POTASSIUM SERPL-SCNC: 4.6 MMOL/L (ref 3.6–5.5)
PROT SERPL-MCNC: 7.2 G/DL (ref 6–8.2)
SODIUM SERPL-SCNC: 128 MMOL/L (ref 135–145)
TRIGL SERPL-MCNC: 118 MG/DL (ref 0–149)

## 2025-07-09 PROCEDURE — 36415 COLL VENOUS BLD VENIPUNCTURE: CPT

## 2025-07-09 PROCEDURE — 80053 COMPREHEN METABOLIC PANEL: CPT

## 2025-07-09 PROCEDURE — 80061 LIPID PANEL: CPT

## 2025-07-14 DIAGNOSIS — I10 ESSENTIAL HYPERTENSION: ICD-10-CM

## 2025-07-14 RX ORDER — VALSARTAN AND HYDROCHLOROTHIAZIDE 320; 25 MG/1; MG/1
1 TABLET, FILM COATED ORAL DAILY
Qty: 90 TABLET | Refills: 3 | Status: SHIPPED | OUTPATIENT
Start: 2025-07-14 | End: 2025-07-16

## 2025-07-16 ENCOUNTER — OFFICE VISIT (OUTPATIENT)
Dept: MEDICAL GROUP | Facility: MEDICAL CENTER | Age: 65
End: 2025-07-16
Attending: FAMILY MEDICINE
Payer: MEDICARE

## 2025-07-16 VITALS
HEART RATE: 100 BPM | OXYGEN SATURATION: 98 % | BODY MASS INDEX: 24.91 KG/M2 | WEIGHT: 155 LBS | RESPIRATION RATE: 16 BRPM | DIASTOLIC BLOOD PRESSURE: 76 MMHG | HEIGHT: 66 IN | TEMPERATURE: 98.1 F | SYSTOLIC BLOOD PRESSURE: 130 MMHG

## 2025-07-16 DIAGNOSIS — Z12.11 ENCOUNTER FOR COLORECTAL CANCER SCREENING USING COLOGUARD TEST: ICD-10-CM

## 2025-07-16 DIAGNOSIS — E87.1 CHRONIC HYPONATREMIA: ICD-10-CM

## 2025-07-16 DIAGNOSIS — N52.9 ERECTILE DYSFUNCTION, UNSPECIFIED ERECTILE DYSFUNCTION TYPE: ICD-10-CM

## 2025-07-16 DIAGNOSIS — I10 ESSENTIAL HYPERTENSION: ICD-10-CM

## 2025-07-16 DIAGNOSIS — Z12.12 ENCOUNTER FOR COLORECTAL CANCER SCREENING USING COLOGUARD TEST: ICD-10-CM

## 2025-07-16 PROCEDURE — 3075F SYST BP GE 130 - 139MM HG: CPT | Performed by: FAMILY MEDICINE

## 2025-07-16 PROCEDURE — 99213 OFFICE O/P EST LOW 20 MIN: CPT | Performed by: FAMILY MEDICINE

## 2025-07-16 PROCEDURE — 3078F DIAST BP <80 MM HG: CPT | Performed by: FAMILY MEDICINE

## 2025-07-16 PROCEDURE — 99214 OFFICE O/P EST MOD 30 MIN: CPT | Performed by: FAMILY MEDICINE

## 2025-07-16 RX ORDER — VALSARTAN 320 MG/1
320 TABLET ORAL DAILY
Qty: 90 TABLET | Refills: 0 | Status: SHIPPED | OUTPATIENT
Start: 2025-07-16 | End: 2026-08-20

## 2025-07-16 ASSESSMENT — FIBROSIS 4 INDEX: FIB4 SCORE: 1.59

## 2025-07-16 NOTE — PROGRESS NOTES
Verbal consent was acquired by the patient to use [employer] ambient listening note generation during this visit.    Subjective   Chief Complaint   Patient presents with    Follow-Up        HPI:   Misael presents today with    History of Present Illness  The patient presents for a 6-month follow-up.    Blood Pressure Management  He has been monitoring his blood pressure at home, with readings as low as 120/70s while on medication. His typical readings are around 135, but they can rise to the 140s after consuming a high-fat meal. He is currently on the maximum dose of valsartan 320 mg and hydrochlorothiazide 25 mg. He has been engaging in regular exercise, specifically long walks, and has been avoiding saturated fats. Despite his love for cheese, he has been making dietary adjustments. Additionally, he has reduced his alcohol intake to a couple of drinks per day, down from several beers or an entire bottle of wine.  - Onset: Monitoring blood pressure at home.  - Location: Blood pressure readings.  - Character: Readings as low as 120/70s, typical readings around 135, rising to 140s after high-fat meals.  - Alleviating/Aggravating Factors: Medication (valsartan 320 mg and hydrochlorothiazide 25 mg), regular exercise, avoiding saturated fats, dietary adjustments, reduced alcohol intake.  - Timing: Blood pressure readings vary based on diet and medication.  - Severity: Managed with medication and lifestyle changes.    Erectile Dysfunction (ED)  He reports experiencing erectile dysfunction (ED), which he believes started around the same time his blood pressure medication was increased by another physician. He has not yet made the recommended dietary changes to decrease his fluid intake to 1.5 L and increase his protein intake to at least 62 g per day based on his body weight.  - Onset: Started around the same time his blood pressure medication was increased.  - Character: Erectile dysfunction.  - Alleviating/Aggravating  "Factors: Blood pressure medication increase.    Sleep Apnea  He has established care with sleep medicine and has been using a CPAP machine, with scores generally in the 90s and occasionally reaching 100. He acknowledges that removing the mask to go to the bathroom affects his compliance scores.  - Onset: Established care with sleep medicine.  - Character: CPAP machine usage.  - Alleviating/Aggravating Factors: Removing the mask to go to the bathroom affects compliance scores.  - Severity: Compliance scores generally in the 90s, occasionally reaching 100.    He is due for a Cologuard test, which he last completed 3 years ago.    Diet: Avoiding saturated fats, reduced alcohol intake  Alcohol: Reduced to a couple of drinks per day  Sleep: Uses CPAP machine, compliance scores generally in the 90s      Health Maintenance Due   Topic Date Due    Annual Wellness Visit  Never done       Objective   Social History[1]    Exam:  /76   Pulse 100   Temp 36.7 °C (98.1 °F)   Resp 16   Ht 1.676 m (5' 5.98\")   Wt 70.3 kg (155 lb)   SpO2 98%   BMI 25.03 kg/m²     Physical Exam  Constitutional: Alert, no distress  Skin: No rashes in visible areas  Eye: Conjunctiva clear, lids normal  Respiratory: Unlabored respiratory effort, no cough  MSK: Normal gait, moves all extremities  Psych: Alert and oriented x3, normal affect and mood    Allergies[2]    [id number] [place], [place] - [address] [place] [id number]  Phone: [phone] Fax: [phone]    [facility] - [address] [address]  Phone: [phone] Fax: [phone]    Current Medications[3]    Assessment & Plan    64 y.o. male with the following -   1. Essential hypertension  valsartan (DIOVAN) 320 MG tablet      2. Erectile dysfunction, unspecified erectile dysfunction type        3. Chronic hyponatremia  Comp Metabolic Panel      4. Encounter for colorectal cancer " screening using Cologuard test  Cologuard® colon cancer screening        Assessment & Plan  1. Hypertension: Stable.  - Blood pressure readings are within the target range, with recent readings around 135/80 mmHg and occasional spikes to the 140s after high-fat meals.  - Currently on the maximum dose of valsartan 320 mg and hydrochlorothiazide 25 mg.  - Continue heart-healthy exercises and dietary modifications, including reducing saturated fat intake and limiting alcohol consumption.  - Maintain hydration with water, especially during the hot summer months.  - Continue with valsartan 320 mg. Prescription for a 90-day supply will be sent to the pharmacy.  - Monitor blood pressure at home and report any readings consistently above 140 mmHg.    2. Erectile Dysfunction: Chronic.  - Erectile dysfunction may be more closely linked to chronic hyponatremia than to antihypertensive medication.  - Adjusting blood pressure medication may help improve ED symptoms.  - Follow recommendations provided by Dr. Marie in 2023, which include reducing fluid intake to 1.5 L and increasing protein intake to at least 62 g per day based on body weight.  - Continue with valsartan 320 mg.    3. Chronic Hyponatremia: Chronic.  - Sodium levels have remained stable over the past few years, but chronic hyponatremia may be contributing to erectile dysfunction.  - Follow Dr. Marie's recommendations from 2023, which include reducing fluid intake to 1.5 L and increasing protein intake to at least 62 g per day.  - Metabolic panel will be ordered today to assess sodium levels after these changes have been implemented.  - If sodium levels remain low despite these changes, further investigation into the cause of hyponatremia will be necessary.    4. Health Maintenance.  - Cologuard test has been ordered as it has been three years since the last screening.    Follow-up  - Recheck sodium levels after implementing dietary and fluid intake changes.  - Blood  pressure monitoring at home with updates in 2 to 4 weeks.      No follow-ups on file.    Please be advised that this document was generated using voice recognition software. While I have made reasonable efforts to correct any obvious errors, there may still be grammatical or content inaccuracies that were not identified or corrected prior to finalization.                [1]   Social History  Tobacco Use    Smoking status: Never    Smokeless tobacco: Never   Vaping Use    Vaping status: Never Used   Substance Use Topics    Alcohol use: Yes     Alcohol/week: 8.4 oz     Types: 7 Glasses of wine, 7 Cans of beer per week     Comment: Daily since 2012; denies DT- Still drinking daily; cut down to a 1 beer/wine    Drug use: No     Comment: Tried Marijuana in past   [2]   Allergies  Allergen Reactions    Atovaquone-Proguanil Hcl Hives and Itching    Sulfa Drugs Rash     rash   [3]   Current Outpatient Medications   Medication Sig Dispense Refill    valsartan (DIOVAN) 320 MG tablet Take 1 Tablet by mouth every day. 90 Tablet 0    VITAMIN D PO Take  by mouth.      Cyanocobalamin (B-12 PO) Take  by mouth.       No current facility-administered medications for this visit.

## 2025-08-13 ENCOUNTER — HOSPITAL ENCOUNTER (OUTPATIENT)
Dept: LAB | Facility: MEDICAL CENTER | Age: 65
End: 2025-08-13
Attending: FAMILY MEDICINE
Payer: MEDICARE

## 2025-08-13 DIAGNOSIS — E87.1 CHRONIC HYPONATREMIA: ICD-10-CM

## 2025-08-13 LAB
ALBUMIN SERPL BCP-MCNC: 4.2 G/DL (ref 3.2–4.9)
ALBUMIN/GLOB SERPL: 1.6 G/DL
ALP SERPL-CCNC: 65 U/L (ref 30–99)
ALT SERPL-CCNC: 23 U/L (ref 2–50)
ANION GAP SERPL CALC-SCNC: 13 MMOL/L (ref 7–16)
AST SERPL-CCNC: 33 U/L (ref 12–45)
BILIRUB SERPL-MCNC: 0.7 MG/DL (ref 0.1–1.5)
BUN SERPL-MCNC: 5 MG/DL (ref 8–22)
CALCIUM ALBUM COR SERPL-MCNC: 8.8 MG/DL (ref 8.5–10.5)
CALCIUM SERPL-MCNC: 9 MG/DL (ref 8.5–10.5)
CHLORIDE SERPL-SCNC: 95 MMOL/L (ref 96–112)
CO2 SERPL-SCNC: 23 MMOL/L (ref 20–33)
CREAT SERPL-MCNC: 0.61 MG/DL (ref 0.5–1.4)
GFR SERPLBLD CREATININE-BSD FMLA CKD-EPI: 106 ML/MIN/1.73 M 2
GLOBULIN SER CALC-MCNC: 2.6 G/DL (ref 1.9–3.5)
GLUCOSE SERPL-MCNC: 94 MG/DL (ref 65–99)
POTASSIUM SERPL-SCNC: 3.9 MMOL/L (ref 3.6–5.5)
PROT SERPL-MCNC: 6.8 G/DL (ref 6–8.2)
SODIUM SERPL-SCNC: 131 MMOL/L (ref 135–145)

## 2025-08-13 PROCEDURE — 36415 COLL VENOUS BLD VENIPUNCTURE: CPT

## 2025-08-13 PROCEDURE — 80053 COMPREHEN METABOLIC PANEL: CPT

## 2025-08-14 ENCOUNTER — RESULTS FOLLOW-UP (OUTPATIENT)
Dept: MEDICAL GROUP | Facility: MEDICAL CENTER | Age: 65
End: 2025-08-14
Payer: MEDICARE

## 2025-08-14 DIAGNOSIS — E87.1 CHRONIC HYPONATREMIA: Primary | ICD-10-CM
